# Patient Record
Sex: FEMALE | Race: AMERICAN INDIAN OR ALASKA NATIVE | NOT HISPANIC OR LATINO | Employment: FULL TIME | URBAN - METROPOLITAN AREA
[De-identification: names, ages, dates, MRNs, and addresses within clinical notes are randomized per-mention and may not be internally consistent; named-entity substitution may affect disease eponyms.]

---

## 2017-02-09 ENCOUNTER — ALLIED HEALTH/NURSE VISIT (OUTPATIENT)
Dept: FAMILY MEDICINE | Facility: CLINIC | Age: 32
End: 2017-02-09

## 2017-02-09 VITALS
BODY MASS INDEX: 23.5 KG/M2 | DIASTOLIC BLOOD PRESSURE: 66 MMHG | WEIGHT: 137 LBS | TEMPERATURE: 98 F | SYSTOLIC BLOOD PRESSURE: 100 MMHG

## 2017-02-09 NOTE — NURSING NOTE
I administered the following to Elizabet Stapleton.    MEDICATION: Depo Provera 150mg  ROUTE: IM  SITE: RUQ - Gluteus  DOSE: 150 mg  LOT #: [id number]  :  Q1 Labs   EXPIRATION DATE:  05/2021  NDC#: [address]-0     Was entire vial of medication used? Yes    Name of provider who requested the injection: Dr. Castellon  Name of provider on site at the time of performing the injection: Dr. Cande Sage, MA  [facility]

## 2017-03-17 ENCOUNTER — OFFICE VISIT (OUTPATIENT)
Dept: FAMILY MEDICINE | Facility: CLINIC | Age: 32
End: 2017-03-17

## 2017-03-17 VITALS
HEIGHT: 65 IN | TEMPERATURE: 98.1 F | OXYGEN SATURATION: 98 % | HEART RATE: 72 BPM | WEIGHT: 135 LBS | SYSTOLIC BLOOD PRESSURE: 105 MMHG | BODY MASS INDEX: 22.49 KG/M2 | DIASTOLIC BLOOD PRESSURE: 66 MMHG

## 2017-03-17 DIAGNOSIS — F33.2 SEVERE EPISODE OF RECURRENT MAJOR DEPRESSIVE DISORDER, WITHOUT PSYCHOTIC FEATURES (H): Primary | ICD-10-CM

## 2017-03-17 DIAGNOSIS — N89.8 VAGINAL DISCHARGE: ICD-10-CM

## 2017-03-17 RX ORDER — CLINDAMYCIN PHOSPHATE 20 MG/G
1 CREAM VAGINAL AT BEDTIME
Qty: 40 G | Refills: 0 | Status: SHIPPED | OUTPATIENT
Start: 2017-03-17 | End: 2017-03-24

## 2017-03-17 NOTE — PATIENT INSTRUCTIONS
Amitryptiline - helps with depression and headaches.  It makes most people sleepy, so take it at bedtime.  Starting at low dose (25 mg) - expect to see 25-50% improvement in depression.    Clindamycin - discharge.    Follow-up in April when you're due for next depo.  We'll cover hemorrhoids then too.

## 2017-03-17 NOTE — MR AVS SNAPSHOT
After Visit Summary   3/17/2017    Elizabet Stapleton    MRN: [id number]           Patient Information     Date Of Birth          1985        Visit Information        Provider Department      3/17/2017 9:00 AM Breana Castellon MD [facility]        Today's Diagnoses     Severe episode of recurrent major depressive disorder, without psychotic features (H)    -  1      Care Instructions    Amitryptiline - helps with depression and headaches.  It makes most people sleepy, so take it at bedtime.  Starting at low dose (25 mg) - expect to see 25-50% improvement in depression.    Clindamycin - discharge.    Follow-up in April when you're due for next depo.  We'll cover hemorrhoids then too.        Follow-ups after your visit        Who to contact     Please call your clinic at [phone] to:    Ask questions about your health    Make or cancel appointments    Discuss your medicines    Learn about your test results    Speak to your doctor   If you have compliments or concerns about an experience at your clinic, or if you wish to file a complaint, please contact [facility] Patient Relations at [phone] or email us at [email or web address]         Additional Information About Your Visit        MyChart Information     LiveLoopt gives you secure access to your electronic health record. If you see a primary care provider, you can also send messages to your care team and make appointments. If you have questions, please call your primary care clinic.  If you do not have a primary care provider, please call [phone] and they will assist you.      MyDoc is an electronic gateway that provides easy, online access to your medical records. With MyDoc, you can request a clinic appointment, read your test results, renew a prescription or communicate with your care team.     To access your existing account, please contact your [facility] or call  "[phone] for assistance.        Care EveryWhere ID     This is your Care EveryWhere ID. This could be used by other organizations to access your [facility] medical records  [id number]        Your Vitals Were     Pulse Temperature Height Pulse Oximetry BMI (Body Mass Index)       72 98.1  F (36.7  C) (Oral) 5' 4.5\" (163.8 cm) 98% 22.81 kg/m2        Blood Pressure from Last 3 Encounters:   03/17/17 105/66   02/09/17 100/66   11/10/16 104/64    Weight from Last 3 Encounters:   03/17/17 135 lb (61.2 kg)   02/09/17 137 lb (62.1 kg)   11/10/16 132 lb 6.4 oz (60.1 kg)              Today, you had the following     No orders found for display         Today's Medication Changes          These changes are accurate as of: 3/17/17  9:48 AM.  If you have any questions, ask your nurse or doctor.               Start taking these medicines.        Dose/Directions    amitriptyline 25 MG tablet   Commonly known as:  ELAVIL   Used for:  Severe episode of recurrent major depressive disorder, without psychotic features (H)   Started by:  Breana Castellon MD        Dose:  25 mg   Take 1 tablet (25 mg) by mouth At Bedtime   Quantity:  30 tablet   Refills:  1            Where to get your medicines      These medications were sent to [facility] - [place], [place] - [address], [place] [address]     Phone:  [phone]     amitriptyline 25 MG tablet                Primary Care Provider Office Phone # Fax #    Juanita Casanova [phone]       [address] [address]        Thank you!     Thank you for choosing [facility]  for your care. Our goal is always to provide you with excellent care. Hearing back from our patients is one way we can continue to improve our services. Please take a few minutes to complete the written survey that you may receive in the mail after your visit with us. Thank you!             Your Updated Medication List " - Protect others around you: Learn how to safely use, store and throw away your medicines at www.disposemymeds.org.          This list is accurate as of: 3/17/17  9:48 AM.  Always use your most recent med list.                   Brand Name Dispense Instructions for use    amitriptyline 25 MG tablet    ELAVIL    30 tablet    Take 1 tablet (25 mg) by mouth At Bedtime       hydrOXYzine 25 MG capsule    VISTARIL    120 capsule    Take 1 capsule (25 mg) by mouth 3 times daily as needed for itching       medroxyPROGESTERone 150 MG/ML injection    DEPO-PROVERA    0.9 mL    Inject 1 mL (150 mg) into the muscle every 3 months       order for DME     1 Units    Equipment being ordered: belly band       Prenatal Vitamin 27-0.8 MG Tabs     30 tablet    Take 1 tablet by mouth daily       SUMAtriptan 25 MG tablet    IMITREX    18 tablet    Take 1-2 tablets (25-50 mg) by mouth at onset of headache for migraine May repeat in 2 hours. Max 8 tablets/24 hours.

## 2017-03-24 ENCOUNTER — TELEPHONE (OUTPATIENT)
Dept: FAMILY MEDICINE | Facility: CLINIC | Age: 32
End: 2017-03-24

## 2017-03-24 NOTE — TELEPHONE ENCOUNTER
[facility] phone call message- general phone call:    Reason for call: Called and left a message for pt to schedule a f/u appt on depression. Pt called back and is scheduled for 03/27 at 2:10 with Dr Casanova. Pt no showed for her appt on 03/27.  I called and rescheduled her for 04/05 at 1:50 with Cande.    Return call needed: No    OK to leave a message on voice mail? No    Primary language: English      needed? No    Call taken on March 24, 2017 at 12:48 PM by Kan Culp

## 2017-03-24 NOTE — PROGRESS NOTES
"Subjective   Elizabet Stapleton is a 31 year old female with a history including anxiety, depression, and abnormal Pap smear who presents because her psychologist wanted her to consider starting a depression medication.    She attends weekly therapy at [facility] with Dr. Quezada.  She says that she's tried medications in the past (she doesn t remember the names), but she does not recall them being effective. Review of her chart shows that she was prescribed sertraline 50 mg daily from April 2014 to November 2015.  Progress notes from that time report that she did not notice a benefit but other people in her life, such as her fiance, thought she had improved mood.  She did not have any side effects.  She was reluctant to continue the medication at that time and wanted to pursue therapy instead.    Today, we discussed several classes of medications including SSRIs, SNRIs, TCAs, etc.  The side effects most important to her are: she wants to avoid headaches; prefers a medication with drowsiness side effects for bedtime; and wants one that does not impair libido or cause constipation.  She is not concerned about weight gain.    Today, her PHQ-9 is 27/27.  She endorses passive thoughts that she would be better off dead but denies suicidal ideation, homicidal ideation, or visual or auditory hallucinations.  She does not have a plan to harm herself or others.  She says that if she were to feel that way, she can access her therapy team 24/7 for help.    Social: Non-smoker.    Objective   Vitals: /66  Pulse 72  Temp 98.1  F (36.7  C) (Oral)  Ht 5' 4.5\" (163.8 cm)  Wt 135 lb (61.2 kg)  SpO2 98%  BMI 22.81 kg/m2  General: Pleasant. Young woman. No distress.  Psych: Appropriate grooming and hygiene. Speech normal rate. Thoughts are logical and well-organized. Good insight. Appropriate mood and affect. No evidence of internal stimuli.    Assessment & Plan   Major depressive disorder, currently managed with weekly " therapy but PHQ-9 still 27/27.  -- Discussed various medications.  She's had a trial of sertraline at submaximal dosing (50 mg) in the past with uncertain effect.  Based on side effects she prioritizes, she has decided on amitriptyline to possibly help with headaches and insomnia as well.  Started amitriptyline 25 mg.  -- No SI/HI.    Return to clinic next month to consider dose increase.  Due for Depo next month too.    ------------  A total of 25 minutes were spent face-to-face with the patient during this visit, with 20 minutes of that time spent on shared decision making regarding antidepressant medication options and side effect profiles.

## 2017-04-05 ENCOUNTER — OFFICE VISIT (OUTPATIENT)
Dept: FAMILY MEDICINE | Facility: CLINIC | Age: 32
End: 2017-04-05

## 2017-04-05 VITALS
TEMPERATURE: 98 F | WEIGHT: 136 LBS | SYSTOLIC BLOOD PRESSURE: 108 MMHG | HEART RATE: 72 BPM | BODY MASS INDEX: 22.98 KG/M2 | DIASTOLIC BLOOD PRESSURE: 68 MMHG | OXYGEN SATURATION: 100 %

## 2017-04-05 DIAGNOSIS — F33.2 SEVERE EPISODE OF RECURRENT MAJOR DEPRESSIVE DISORDER, WITHOUT PSYCHOTIC FEATURES (H): Primary | ICD-10-CM

## 2017-04-05 ASSESSMENT — ANXIETY QUESTIONNAIRES
GAD7 TOTAL SCORE: 21
1. FEELING NERVOUS, ANXIOUS, OR ON EDGE: NEARLY EVERY DAY
6. BECOMING EASILY ANNOYED OR IRRITABLE: NEARLY EVERY DAY
7. FEELING AFRAID AS IF SOMETHING AWFUL MIGHT HAPPEN: NEARLY EVERY DAY
2. NOT BEING ABLE TO STOP OR CONTROL WORRYING: NEARLY EVERY DAY
5. BEING SO RESTLESS THAT IT IS HARD TO SIT STILL: NEARLY EVERY DAY
3. WORRYING TOO MUCH ABOUT DIFFERENT THINGS: NEARLY EVERY DAY
IF YOU CHECKED OFF ANY PROBLEMS ON THIS QUESTIONNAIRE, HOW DIFFICULT HAVE THESE PROBLEMS MADE IT FOR YOU TO DO YOUR WORK, TAKE CARE OF THINGS AT HOME, OR GET ALONG WITH OTHER PEOPLE: EXTREMELY DIFFICULT

## 2017-04-05 ASSESSMENT — PATIENT HEALTH QUESTIONNAIRE - PHQ9: 5. POOR APPETITE OR OVEREATING: NEARLY EVERY DAY

## 2017-04-05 NOTE — MR AVS SNAPSHOT
After Visit Summary   4/5/2017    Elizabet Stapleton    MRN: [id number]           Patient Information     Date Of Birth          1985        Visit Information        Provider Department      4/5/2017 1:50 PM Breana Castellon MD [facility]        Today's Diagnoses     Severe episode of recurrent major depressive disorder, without psychotic features (H)    -  1       Follow-ups after your visit        Who to contact     Please call your clinic at [phone] to:    Ask questions about your health    Make or cancel appointments    Discuss your medicines    Learn about your test results    Speak to your doctor   If you have compliments or concerns about an experience at your clinic, or if you wish to file a complaint, please contact [facility] Patient Relations at [phone] or email us at [email or web address]         Additional Information About Your Visit        MyChart Information     Cardpoolt gives you secure access to your electronic health record. If you see a primary care provider, you can also send messages to your care team and make appointments. If you have questions, please call your primary care clinic.  If you do not have a primary care provider, please call [phone] and they will assist you.      [employer] is an electronic gateway that provides easy, online access to your medical records. With [employer], you can request a clinic appointment, read your test results, renew a prescription or communicate with your care team.     To access your existing account, please contact your [facility] or call [phone] for assistance.        Care EveryWhere ID     This is your Care EveryWhere ID. This could be used by other organizations to access your [facility] medical records  [id number]        Your Vitals Were     Pulse Temperature Pulse Oximetry BMI (Body Mass Index)          72 98  F (36.7  C) 100% 22.98 kg/m2         Blood  Pressure from Last 3 Encounters:   04/05/17 108/68   03/17/17 105/66   02/09/17 100/66    Weight from Last 3 Encounters:   04/05/17 136 lb (61.7 kg)   03/17/17 135 lb (61.2 kg)   02/09/17 137 lb (62.1 kg)              Today, you had the following     No orders found for display         Today's Medication Changes          These changes are accurate as of: 4/5/17 11:59 PM.  If you have any questions, ask your nurse or doctor.               Start taking these medicines.        Dose/Directions    sertraline 50 MG tablet   Commonly known as:  ZOLOFT   Used for:  Severe episode of recurrent major depressive disorder, without psychotic features (H)   Started by:  Breana Castellon MD        Take 1/2 tablet (25 mg) for 1-2 weeks, then increase to 1 tablet orally daily   Quantity:  30 tablet   Refills:  0         Stop taking these medicines if you haven't already. Please contact your care team if you have questions.     amitriptyline 25 MG tablet   Commonly known as:  ELAVIL   Stopped by:  Breana Castellon MD                Where to get your medicines      These medications were sent to [facility] - [place], [place] - [address], [place] [address]     Phone:  [phone]     sertraline 50 MG tablet                Primary Care Provider Office Phone # Fax #    Juanita CasanovaDO [phone]       [address] [address]        Thank you!     Thank you for choosing [facility]  for your care. Our goal is always to provide you with excellent care. Hearing back from our patients is one way we can continue to improve our services. Please take a few minutes to complete the written survey that you may receive in the mail after your visit with us. Thank you!             Your Updated Medication List - Protect others around you: Learn how to safely use, store and throw away your medicines at www.disposemymeds.org.          This list is  accurate as of: 4/5/17 11:59 PM.  Always use your most recent med list.                   Brand Name Dispense Instructions for use    hydrOXYzine 25 MG capsule    VISTARIL    120 capsule    Take 1 capsule (25 mg) by mouth 3 times daily as needed for itching       medroxyPROGESTERone 150 MG/ML injection    DEPO-PROVERA    0.9 mL    Inject 1 mL (150 mg) into the muscle every 3 months       order for DME     1 Units    Equipment being ordered: belly band       Prenatal Vitamin 27-0.8 MG Tabs     30 tablet    Take 1 tablet by mouth daily       sertraline 50 MG tablet    ZOLOFT    30 tablet    Take 1/2 tablet (25 mg) for 1-2 weeks, then increase to 1 tablet orally daily       SUMAtriptan 25 MG tablet    IMITREX    18 tablet    Take 1-2 tablets (25-50 mg) by mouth at onset of headache for migraine May repeat in 2 hours. Max 8 tablets/24 hours.

## 2017-04-06 ASSESSMENT — PATIENT HEALTH QUESTIONNAIRE - PHQ9: SUM OF ALL RESPONSES TO PHQ QUESTIONS 1-9: 27

## 2017-04-06 ASSESSMENT — ANXIETY QUESTIONNAIRES: GAD7 TOTAL SCORE: 21

## 2017-04-14 NOTE — PROGRESS NOTES
Subjective   Elizabet Stapleton is a 31-year-old female with history including depression and abnormal Pap smear who presents for depression follow-up since starting a medication at the last visit 3 weeks ago.    At that visit, she was started on amitriptyline, which she chose after some shared decision making.  She chose it because she was interested in the potential benefits of TCAs for her insomnia and headaches as well.  She reports that since starting this, her depressive symptoms have been worse with specifically increased mood swings and detachment from people.  She has not noticed the beneficial side effects of improvement in headaches or sleep, therefore she would like to change to a different medication.  Her PHQ-9 score remains 27/27 today with passive suicidal ideation but no active plans or intent.  She otherwise has fatigue, apathy, insomnia, and decreased concentration.  She is interested in an SSRI today, which was her second choice upon her shared decision making at the last visit.  She is concerned about possible effects on weight and libido.  She continues to see her psychologist weekly, which is her plan of safety contact should she have any active suicidal ideation.    PHQ-9 ([employer]) 4/5/2017   1.  Little interest or pleasure in doing things 3   2.  Feeling down, depressed, or hopeless 3   3.  Trouble falling or staying asleep, or sleeping too much 3   4.  Feeling tired or having little energy 3   5.  Poor appetite or overeating 3   6.  Feeling bad about yourself 3   7.  Trouble concentrating 3   8.  Moving slowly or restless 3   9.  Suicidal or self-harm thoughts 3   PHQ-9 Total Score 27   Difficulty at work, home, or with people Extremely dIfficult     Social: Non-smoker.    Objective   Vitals: /68  Pulse 72  Temp 98  F (36.7  C)  Wt 136 lb (61.7 kg)  SpO2 100%  BMI 22.98 kg/m2  General: Pleasant. Young woman. No distress.  Psych: Appropriate grooming and hygiene. Speech normal rate.  Thoughts are logical and well-organized. Good insight. Appropriate mood and affect. No evidence of internal stimuli.      Assessment & Plan   Major depressive disorder, currently active and severe with PHQ-9 score of 27/27.  She endorses passive suicidal ideation but without intent or active plan.  She contracts for safety.  -- Continue weekly visits with psychotherapist.  -- Recently started on med - amitriptyline - at last visit, but she feels this presented more side effects than benefit and she would like to switch medications today.  Start an SSRI: Ordered sertraline titrating from 25 to 50 mg daily, with plans to increase to 100 at the next visit if well-tolerated.    Return to clinic in 1 month.

## 2017-04-24 ENCOUNTER — TRANSFERRED RECORDS (OUTPATIENT)
Dept: HEALTH INFORMATION MANAGEMENT | Facility: CLINIC | Age: 32
End: 2017-04-24

## 2017-05-30 ENCOUNTER — OFFICE VISIT (OUTPATIENT)
Dept: FAMILY MEDICINE | Facility: CLINIC | Age: 32
End: 2017-05-30

## 2017-05-30 VITALS
WEIGHT: 140.6 LBS | DIASTOLIC BLOOD PRESSURE: 57 MMHG | SYSTOLIC BLOOD PRESSURE: 91 MMHG | TEMPERATURE: 98.2 F | HEART RATE: 66 BPM | BODY MASS INDEX: 23.76 KG/M2

## 2017-05-30 DIAGNOSIS — F33.2 SEVERE EPISODE OF RECURRENT MAJOR DEPRESSIVE DISORDER, WITHOUT PSYCHOTIC FEATURES (H): ICD-10-CM

## 2017-05-30 DIAGNOSIS — Z30.42 ENCOUNTER FOR SURVEILLANCE OF INJECTABLE CONTRACEPTIVE: Primary | ICD-10-CM

## 2017-05-30 LAB — HCG UR QL: NEGATIVE

## 2017-05-30 ASSESSMENT — ANXIETY QUESTIONNAIRES
5. BEING SO RESTLESS THAT IT IS HARD TO SIT STILL: SEVERAL DAYS
2. NOT BEING ABLE TO STOP OR CONTROL WORRYING: SEVERAL DAYS
7. FEELING AFRAID AS IF SOMETHING AWFUL MIGHT HAPPEN: SEVERAL DAYS
1. FEELING NERVOUS, ANXIOUS, OR ON EDGE: SEVERAL DAYS
3. WORRYING TOO MUCH ABOUT DIFFERENT THINGS: SEVERAL DAYS
GAD7 TOTAL SCORE: 8
6. BECOMING EASILY ANNOYED OR IRRITABLE: MORE THAN HALF THE DAYS
IF YOU CHECKED OFF ANY PROBLEMS ON THIS QUESTIONNAIRE, HOW DIFFICULT HAVE THESE PROBLEMS MADE IT FOR YOU TO DO YOUR WORK, TAKE CARE OF THINGS AT HOME, OR GET ALONG WITH OTHER PEOPLE: VERY DIFFICULT

## 2017-05-30 ASSESSMENT — PATIENT HEALTH QUESTIONNAIRE - PHQ9: 5. POOR APPETITE OR OVEREATING: SEVERAL DAYS

## 2017-05-30 NOTE — PATIENT INSTRUCTIONS
Increase zoloft 100mg daily  Follow up in 1-3 weeks    If you have thoughts about self harm or if you just need additional support and care, here are some resources for you:    Crisis Lines:    [facility]:  [phone]  Crisis Connection:  [phone]  [facility] [[email or web address]] Multilingual Crisis Line:  [phone]    You can also consider going to the [facility] at the following address.  Walk ins are welcome:    [address] [place] [address]   [phone] (for 24 hour crisis consultation)    Monday - Friday 8:00am - 7:00pm  Saturday:  11:00am - 3:00pm  Sunday and Holidays Closed    If you feel at risk of immediate harm, go directly to the Emergency Department.

## 2017-05-30 NOTE — Clinical Note
Merrill Iyer- Could you please complete the unfinished variables in your note and route to Dr. Hardin for review and closure? Thank you- Katie

## 2017-05-30 NOTE — PROGRESS NOTES
SUBJECTIVE       Elizabet Stapleton is a 31 year old  female with a PMHx significant for:     Patient Active Problem List   Diagnosis     Abnormal Pap smear of cervix     Generalized anxiety disorder     Major depressive disorder, recurrent episode, moderate (H)     Obsessive-compulsive disorder     Human papillomavirus in conditions classified elsewhere and of unspecified site     Health Care Home     Family history of cystic fibrosis     Contraception     Here today for depression and over due depo    #Depo  -overdue, about a month  -last intercourse was 3 weeks ago, maybe more   -does not want to explore different options for birth control    #Depression/Anxiety  -symptoms for a while but recently started getting help for them   -started on sertaline last visit, 4/2017. Currently at 50mg dose, taking it at night   -reports she was feeling scared at night, so switched to days and was a little better but still feeling paranoia, that someone is watching her. Previously she was having paranoia regarding her children's safety but this is better  -Currently seeing a psychologist, every week   -her PHQ9 is 8/21 today  -Felt that the medication was helping some things  -endorses passive SI but denies specific plan, would contact family or therapist if feelings became worse    Patient speaks English and so an  was not used.    PMH, Medications and Allergies were reviewed and updated as needed.          OBJECTIVE     Vitals:    05/30/17 1508   BP: 91/57   BP Location: Left arm   Patient Position: Chair   Cuff Size: Adult Regular   Pulse: 66   Temp: 98.2  F (36.8  C)   TempSrc: Oral   Weight: 140 lb 9.6 oz (63.8 kg)     Body mass index is 23.76 kg/(m^2).    Gen:  NAD  HEENT: mucous membranes moist  CV:  RRR  - no murmurs, rubs, or gallups  Pulm:  CTAB, no wheezes/rales/rhonchi  Psych: anxious and depressed affect      No results found for this or any previous visit (from the past 24 hour(s)).      ASSESSMENT AND PLAN      Elizabet was seen today for contraception and recheck medication.    Diagnoses and all orders for this visit:    Encounter for surveillance of injectable contraceptive: depo overdue. UPT negative and last intercourse 3 weeks ago. Given depo.   -     HCG Qualitative Urine (UPT)  ([facility])  -     medroxyPROGESTERone (DEPO-PROVERA) injection 150 mg ([facility])    Severe episode of recurrent major depressive disorder, without psychotic features (H): feels better on medication, will increase dose to 100mg. Passive SI but able to contract for safety. Given crisis line numbers. Encouraged her to continue with therapy. No signs of bipolar but does report some feelings of anxiety/ paranoia.   -     sertraline (ZOLOFT) 50 MG tablet; Take 100mg daily.      Patient Instructions   Increase zoloft 100mg daily  Follow up in 1-3 weeks    If you have thoughts about self harm or if you just need additional support and care, here are some resources for you:    Crisis Lines:    [facility] Adult Crisis:  [phone]  Crisis Connection:  [phone]  [facility] Multilingual Crisis Line:  [phone]    You can also consider going to the [facility] at the following address.  Walk ins are welcome:    [address] [place] [address]   [phone] (for 24 hour crisis consultation)    Monday - Friday 8:00am - 7:00pm  Saturday:  11:00am - 3:00pm  Sunday and Holidays Closed    If you feel at risk of immediate harm, go directly to the Emergency Department.      RTC in 2 weeks for follow up or sooner if develops new or worsening symptoms.    Discussed with MD Sabra Felix, PGY- 3

## 2017-05-30 NOTE — NURSING NOTE
I administered the following to Elizabet Pieter.    MEDICATION: Depo Provera 150mg  ROUTE: IM  SITE: LUQ - Gluteus  DOSE: 1 mL  LOT #: [id number]  :  Seratis   EXPIRATION DATE:  10/2021  NDC#: [address]-8     Next Depo due between 8/15/2017 - 8/29/2017    Was entire vial of medication used? Yes    Name of provider who requested the injection: Sabra Iyer  Name of provider on site (faculty or community preceptor) at the time of performing the injection: Avery Hardin, [facility]

## 2017-05-30 NOTE — PROGRESS NOTES
Preceptor attestation:  Patient seen and discussed with the resident. Assessment and plan reviewed with resident and agreed upon.  Supervising physician: Avery Gee  [facility]

## 2017-05-31 ASSESSMENT — PATIENT HEALTH QUESTIONNAIRE - PHQ9: SUM OF ALL RESPONSES TO PHQ QUESTIONS 1-9: 8

## 2017-05-31 ASSESSMENT — ANXIETY QUESTIONNAIRES: GAD7 TOTAL SCORE: 8

## 2017-07-19 ENCOUNTER — OFFICE VISIT (OUTPATIENT)
Dept: FAMILY MEDICINE | Facility: CLINIC | Age: 32
End: 2017-07-19

## 2017-07-19 VITALS
DIASTOLIC BLOOD PRESSURE: 70 MMHG | HEART RATE: 65 BPM | OXYGEN SATURATION: 99 % | HEIGHT: 64 IN | WEIGHT: 134.2 LBS | TEMPERATURE: 97.7 F | BODY MASS INDEX: 22.91 KG/M2 | SYSTOLIC BLOOD PRESSURE: 107 MMHG

## 2017-07-19 DIAGNOSIS — N93.9 VAGINAL BLEEDING: Primary | ICD-10-CM

## 2017-07-19 LAB — B-HCG SERPL-ACNC: <2 MLU/ML (ref 0–4)

## 2017-07-19 ASSESSMENT — ANXIETY QUESTIONNAIRES
IF YOU CHECKED OFF ANY PROBLEMS ON THIS QUESTIONNAIRE, HOW DIFFICULT HAVE THESE PROBLEMS MADE IT FOR YOU TO DO YOUR WORK, TAKE CARE OF THINGS AT HOME, OR GET ALONG WITH OTHER PEOPLE: EXTREMELY DIFFICULT
3. WORRYING TOO MUCH ABOUT DIFFERENT THINGS: NEARLY EVERY DAY
6. BECOMING EASILY ANNOYED OR IRRITABLE: NEARLY EVERY DAY
7. FEELING AFRAID AS IF SOMETHING AWFUL MIGHT HAPPEN: NEARLY EVERY DAY
5. BEING SO RESTLESS THAT IT IS HARD TO SIT STILL: SEVERAL DAYS
1. FEELING NERVOUS, ANXIOUS, OR ON EDGE: NEARLY EVERY DAY
GAD7 TOTAL SCORE: 19
2. NOT BEING ABLE TO STOP OR CONTROL WORRYING: NEARLY EVERY DAY

## 2017-07-19 ASSESSMENT — PATIENT HEALTH QUESTIONNAIRE - PHQ9: 5. POOR APPETITE OR OVEREATING: NEARLY EVERY DAY

## 2017-07-19 NOTE — PATIENT INSTRUCTIONS
[facility]  [address] [place] [address]  [phone]  *Orders have been faxed, they will contact patient to schedule.  Chloé  07/19/17

## 2017-07-19 NOTE — PROGRESS NOTES
"Subjective   Elizabet Stapleton is a 32 year old female with a history including depression and abnormal Pap smear who presents with complaints of persistent vaginal bleeding.    She was seen in 2016 for initiation of Depo for contraception, and since that time she says that she has had daily vaginal bleeding.  It initially started with increased vaginal discharge, but that has since resolved. The bleeding changes in color from pink to brown, sometimes appearing like old blood versus fresh blood. It also changes from moderate to thick in consistency.  She has been on Depo in the past, and she did not have symptoms like this. More recently in the past 2 weeks she developed pain in her back and pelvis, to the point that she couldn't even walk at one point. And over the past few days, she has been having passage of tissue that looks like chunks of meat. She does not think that she could be pregnant because she has not been very sexually active with this bleeding.  She had negative pregnancy tests on 11/10/16 and 17. She is a . No fevers.  She otherwise denies any GI symptoms including nausea, vomiting, or diarrhea, though she does endorse some rectal bleeding from known hemorrhoids.  She denies weakness or lightheadedness.    Past Pap smear results:  7/27/15 ASCUS Pap with neg HPV -- repeat co-testing in 3 years  12 nl Pap but no ECC (pt pregnant), pt needs repeat Pap in 3yr  3/9/11 nl Pap  09 ASCUS Pap with negative HPV  08 COLP APPT: cervical bx and ECC negative for dysplasia  08 ASCUS Pap with + high risk HPV 35,6  3/30/06 nl Pap    Social: Non-smoker.    Objective   Vitals: /70  Pulse 65  Temp 97.7  F (36.5  C) (Oral)  Ht 5' 4.25\" (163.2 cm)  Wt 134 lb 3.2 oz (60.9 kg)  SpO2 99%  BMI 22.86 kg/m2  General: Pleasant. Young woman. No distress.  Heart: Regular rate and rhythm. No murmurs, rubs, or gallops.  Lungs: Clear to auscultation bilaterally. No wheezes or crackles. Good " air movement.  GI: Abdomen normal to inspection. No ridigidity, distension, or guarding. Normoactive bowel sounds. Soft and non-tender to palpation throughout abdomen.  : External genitalia normal to inspection. No vaginal discharge. Blood and blood clots in vagina, appears to be coming from endocervix.  No other sites of bleeding identified.    Labs reviewed:  Component Chlamydia trac,Amplified Prb N gonorrhoeae,Amplified Prb   Latest Ref Rng & Units Negative Negative   3/9/2011 Negative Negative   8/22/2012 Negative Negative   7/27/2015 Negative Negative   12/28/2015 Negative Negative       Assessment & Plan   32-year-old female presenting with persistent vaginal bleeding since initiation of double in November 2016, with associated pelvic pain and passage of clots and tissue-like material.    Initially it was thought that this bleeding was related to the recent initiation of Depo, but given that she has had daily bleeding for 8 months now, this is unexpected.  Differential infection, malignancy, miscarriage on unrecognized pregnancy, fibroids, etc.  -- B-HCG quant.  -- Check chlamydia and gonorrhea.  No history of this.  -- Pap smear collected.  History of abnromal Pap smears with ASCUS on most recent in 2015.  -- Pelvic ultrasound ordered.    Follow-up next week after all these results come back.

## 2017-07-19 NOTE — MR AVS SNAPSHOT
After Visit Summary   7/19/2017    Elizabet Stapleton    MRN: [id number]           Patient Information     Date Of Birth          1985        Visit Information        Provider Department      7/19/2017 11:00 AM Breana Castellon MD [facility]        Today's Diagnoses     Vaginal bleeding    -  1      Care Instructions    [facility]  [address] [place] [address]  [phone]  *Orders have been faxed, they will contact patient to schedule.  Chloé  07/19/17            Follow-ups after your visit        Future tests that were ordered for you today     Open Future Orders        Priority Expected Expires Ordered    US PELVIS COMPLETE Routine  7/19/2018 7/19/2017            Who to contact     Please call your clinic at [phone] to:    Ask questions about your health    Make or cancel appointments    Discuss your medicines    Learn about your test results    Speak to your doctor   If you have compliments or concerns about an experience at your clinic, or if you wish to file a complaint, please contact [facility] Patient Relations at [phone] or email us at [email or web address]         Additional Information About Your Visit        MyChart Information     SMRxTt gives you secure access to your electronic health record. If you see a primary care provider, you can also send messages to your care team and make appointments. If you have questions, please call your primary care clinic.  If you do not have a primary care provider, please call [phone] and they will assist you.      Maintenance Assistant is an electronic gateway that provides easy, online access to your medical records. With Maintenance Assistant, you can request a clinic appointment, read your test results, renew a prescription or communicate with your care team.     To access your existing account, please contact your [facility] or call [phone] for assistance.        Care  "EveryWhere ID     This is your Care EveryWhere ID. This could be used by other organizations to access your [facility] medical records  [id number]        Your Vitals Were     Pulse Temperature Height Pulse Oximetry BMI (Body Mass Index)       65 97.7  F (36.5  C) (Oral) 5' 4.25\" (163.2 cm) 99% 22.86 kg/m2        Blood Pressure from Last 3 Encounters:   07/19/17 107/70   05/30/17 91/57   04/05/17 108/68    Weight from Last 3 Encounters:   07/19/17 134 lb 3.2 oz (60.9 kg)   05/30/17 140 lb 9.6 oz (63.8 kg)   04/05/17 136 lb (61.7 kg)              We Performed the Following     [facility]CG [facility]     Chlamydia/Gono Amplified ([facility])     GYN Cytology ([facility])        Primary Care Provider Office Phone # Fax #    Juanita Amisha Casanova [phone]       [place] [address]        Equal Access to Services     GERMAIN JONES : Hadii aad ku hadasho Soomaali, waaxda luqadaha, qaybta kaalmada adeegyada, waxterese wren . So [facility] [phone].    ATENCIÓN: Si habla español, tiene a benites disposición servicios gratuitos de asistencia lingüística. Llame al [phone].    We comply with applicable federal civil rights laws and [place] laws. We do not discriminate on the basis of race, color, national origin, age, disability sex, sexual orientation or gender identity.            Thank you!     Thank you for choosing [facility]  for your care. Our goal is always to provide you with excellent care. Hearing back from our patients is one way we can continue to improve our services. Please take a few minutes to complete the written survey that you may receive in the mail after your visit with us. Thank you!             Your Updated Medication List - Protect others around you: Learn how to safely use, store and throw away your medicines at www.disposemymeds.org.          This list is accurate as of: 7/19/17 11:59 PM.  Always use " your most recent med list.                   Brand Name Dispense Instructions for use Diagnosis    hydrOXYzine 25 MG capsule    VISTARIL    120 capsule    Take 1 capsule (25 mg) by mouth 3 times daily as needed for itching    Anxiety       medroxyPROGESTERone 150 MG/ML injection    DEPO-PROVERA    0.9 mL    Inject 1 mL (150 mg) into the muscle every 3 months    Encounter for initial prescription of injectable contraceptive       order for DME     1 Units    Equipment being ordered: belly band    Back pain in pregnancy       Prenatal Vitamin 27-0.8 MG Tabs     30 tablet    Take 1 tablet by mouth daily    Pregnancy test positive       sertraline 50 MG tablet    ZOLOFT    120 tablet    Take 100mg daily.    Severe episode of recurrent major depressive disorder, without psychotic features (H)       SUMAtriptan 25 MG tablet    IMITREX    18 tablet    Take 1-2 tablets (25-50 mg) by mouth at onset of headache for migraine May repeat in 2 hours. Max 8 tablets/24 hours.    Migraine without status migrainosus, not intractable, unspecified migraine type

## 2017-07-20 ENCOUNTER — TELEPHONE (OUTPATIENT)
Dept: FAMILY MEDICINE | Facility: CLINIC | Age: 32
End: 2017-07-20

## 2017-07-20 LAB
C TRACH RRNA CVX QL NAA+PROBE: NEGATIVE
N GONORRHOEA RRNA SPEC QL NAA+PROBE: NEGATIVE

## 2017-07-20 ASSESSMENT — PATIENT HEALTH QUESTIONNAIRE - PHQ9: SUM OF ALL RESPONSES TO PHQ QUESTIONS 1-9: 16

## 2017-07-20 ASSESSMENT — ANXIETY QUESTIONNAIRES: GAD7 TOTAL SCORE: 19

## 2017-07-20 NOTE — TELEPHONE ENCOUNTER
[facility] phone call message- patient requesting results:    Test: Lab    Date of test: 07/19/17    Additional Comments: Please call with lab results.    OK to leave a message on voice mail? Yes    Primary language: English      needed? No    Call taken on July 20, 2017 at 9:31 AM by Linda Cooper

## 2017-07-20 NOTE — TELEPHONE ENCOUNTER
Neg preg test given. Gc/chlamydia and pap are not back yet patient wants DR. Castellon to know that she will go and have her US appt tomorrow. /KEY Thomas

## 2017-07-21 DIAGNOSIS — N93.9 VAGINAL BLEEDING: ICD-10-CM

## 2017-07-21 DIAGNOSIS — N84.0 ENDOMETRIAL POLYP: Primary | ICD-10-CM

## 2017-07-24 ENCOUNTER — TELEPHONE (OUTPATIENT)
Dept: FAMILY MEDICINE | Facility: CLINIC | Age: 32
End: 2017-07-24

## 2017-07-24 NOTE — TELEPHONE ENCOUNTER
[facility] phone call message- patient requesting results:    Test: Lab and Ultrasound    Date of test: 7/20/2017    Additional Comments: the pt called to ask for her results and would like a call back     OK to leave a message on voice mail? Yes    Primary language: English      needed? No    Call taken on July 24, 2017 at 9:03 AM by Raymundo Tyler

## 2017-07-24 NOTE — TELEPHONE ENCOUNTER
Results reviewed in clinic by Dr. Pa. Patient notified of normal results and advised to make a follow up appt if symptoms persist. Patient was transferred to appt. /KEY Thomas  Routed to Dr. Castellon

## 2017-07-25 LAB
INTERPRETATION: NORMAL
INTERPRETER: NORMAL

## 2017-07-27 ENCOUNTER — RECORDS - HEALTHEAST (OUTPATIENT)
Dept: ADMINISTRATIVE | Facility: OTHER | Age: 32
End: 2017-07-27

## 2017-07-27 DIAGNOSIS — R45.4 ANGER REACTION: Primary | ICD-10-CM

## 2017-07-27 LAB
CYTOLOGY CVX/VAG DOC THIN PREP: NORMAL
HIGH RISK?: YES
HPV REFLEX?: NORMAL
LAB AP ABNORMAL BLEEDING: YES
LAB AP BIRTH CONTROL/HORMONES: NORMAL
LAB AP CASE REPORT: NORMAL
LAB AP CERVICAL APPEARANCE: NORMAL
LAB AP MALIGNANT?: NORMAL
LAB AP PATIENT STATUS: NORMAL
LAB AP PREVIOUS ABNL: NORMAL
LAB AP PREVIOUS NORMAL: NORMAL
LAST MENS PERIOD: NORMAL
SPECIMEN ADEQUACY:: NORMAL

## 2017-07-27 RX ORDER — TOPIRAMATE 25 MG/1
25 TABLET, FILM COATED ORAL AT BEDTIME
Qty: 30 TABLET | Refills: 0 | Status: SHIPPED | OUTPATIENT
Start: 2017-07-27 | End: 2017-09-11

## 2017-07-27 NOTE — PROGRESS NOTES
At last visit, we discussed that much of her mood symptoms might be stemming from inappropriate/exaggerated feelings of anger, which subsequently makes her depressed when her children are fearful of her reactions.  No abuse of children or others.    We'll plan to start topiramate, initially at 25 mg qhs, and titrating up as needed.    Breana Castellon MD

## 2017-08-01 NOTE — PATIENT INSTRUCTIONS
[facility]  [phone]  Patient given information to call and schedule  Maria Del Rosario Smith 9:13 AM 8/1/2017

## 2017-08-28 ENCOUNTER — TRANSFERRED RECORDS (OUTPATIENT)
Dept: HEALTH INFORMATION MANAGEMENT | Facility: CLINIC | Age: 32
End: 2017-08-28

## 2017-09-11 ENCOUNTER — OFFICE VISIT (OUTPATIENT)
Dept: FAMILY MEDICINE | Facility: CLINIC | Age: 32
End: 2017-09-11

## 2017-09-11 VITALS
HEART RATE: 63 BPM | TEMPERATURE: 98 F | OXYGEN SATURATION: 99 % | DIASTOLIC BLOOD PRESSURE: 65 MMHG | SYSTOLIC BLOOD PRESSURE: 100 MMHG | HEIGHT: 65 IN | WEIGHT: 139.8 LBS | BODY MASS INDEX: 23.29 KG/M2

## 2017-09-11 DIAGNOSIS — F33.2 SEVERE EPISODE OF RECURRENT MAJOR DEPRESSIVE DISORDER, WITHOUT PSYCHOTIC FEATURES (H): ICD-10-CM

## 2017-09-11 DIAGNOSIS — R45.4 ANGER REACTION: ICD-10-CM

## 2017-09-11 DIAGNOSIS — Z30.42 ENCOUNTER FOR SURVEILLANCE OF INJECTABLE CONTRACEPTIVE: Primary | ICD-10-CM

## 2017-09-11 LAB — HCG UR QL: NEGATIVE

## 2017-09-11 RX ORDER — TOPIRAMATE 50 MG/1
50 TABLET, FILM COATED ORAL 2 TIMES DAILY
Qty: 180 TABLET | Refills: 0 | Status: SHIPPED | OUTPATIENT
Start: 2017-09-11 | End: 2023-08-03

## 2017-09-11 RX ORDER — SERTRALINE HYDROCHLORIDE 100 MG/1
100 TABLET, FILM COATED ORAL DAILY
Qty: 90 TABLET | Refills: 3 | Status: SHIPPED | OUTPATIENT
Start: 2017-09-11 | End: 2023-08-03

## 2017-09-11 ASSESSMENT — ANXIETY QUESTIONNAIRES
GAD7 TOTAL SCORE: 13
6. BECOMING EASILY ANNOYED OR IRRITABLE: NOT AT ALL
5. BEING SO RESTLESS THAT IT IS HARD TO SIT STILL: MORE THAN HALF THE DAYS
7. FEELING AFRAID AS IF SOMETHING AWFUL MIGHT HAPPEN: NOT AT ALL
2. NOT BEING ABLE TO STOP OR CONTROL WORRYING: NEARLY EVERY DAY
3. WORRYING TOO MUCH ABOUT DIFFERENT THINGS: NEARLY EVERY DAY
1. FEELING NERVOUS, ANXIOUS, OR ON EDGE: NEARLY EVERY DAY
IF YOU CHECKED OFF ANY PROBLEMS ON THIS QUESTIONNAIRE, HOW DIFFICULT HAVE THESE PROBLEMS MADE IT FOR YOU TO DO YOUR WORK, TAKE CARE OF THINGS AT HOME, OR GET ALONG WITH OTHER PEOPLE: VERY DIFFICULT

## 2017-09-11 ASSESSMENT — PATIENT HEALTH QUESTIONNAIRE - PHQ9
5. POOR APPETITE OR OVEREATING: MORE THAN HALF THE DAYS
SUM OF ALL RESPONSES TO PHQ QUESTIONS 1-9: 10

## 2017-09-11 NOTE — NURSING NOTE
I administered the following to Elizabet Stapleton.    MEDICATION: Medroxyprogesterone 150 mg  ROUTE: IM  SITE: LUQ - Gluteus  DOSE: 150 mg per 1 ml  LOT #: [id number]  :  [employer]   EXPIRATION DATE:  01 / 2022  NDC#: [address]-7   The reminder card was given to patient and the next depo is due between 11/27/2017 - 12/11/2017    Per Dr. Castellon, the depo was given to patient.   Was entire vial of medication used? Yes    Name of provider who requested the injection: Dr. Castellon  Name of provider on site (faculty or community preceptor) at the time of performing the injection: Dr. Castellon    November Paw, [facility]

## 2017-09-11 NOTE — PATIENT INSTRUCTIONS
Take one 50 mg tab in the morning, and half (25 mg) in the evening for about a week    Then step up to 50 mg twice a day    Message has been sent to  team to advise for mental health referral. See documentation encounter for details.   Chloé  09/25/17

## 2017-09-11 NOTE — MR AVS SNAPSHOT
After Visit Summary   9/11/2017    Elizabet Stapleton    MRN: [id number]           Patient Information     Date Of Birth          1985        Visit Information        Provider Department      9/11/2017 8:20 AM Breana Castellon MD [facility]        Today's Diagnoses     Contraception    -  1    Severe episode of recurrent major depressive disorder, without psychotic features (H)        Anger reaction          Care Instructions    Take one 50 mg tab in the morning, and half (25 mg) in the evening for about a week    Then step up to 50 mg twice a day          Follow-ups after your visit        Who to contact     Please call your clinic at [phone] to:    Ask questions about your health    Make or cancel appointments    Discuss your medicines    Learn about your test results    Speak to your doctor   If you have compliments or concerns about an experience at your clinic, or if you wish to file a complaint, please contact [facility] Patient Relations at [phone] or email us at [email or web address]         Additional Information About Your Visit        MyChart Information     9DIAMOND gives you secure access to your electronic health record. If you see a primary care provider, you can also send messages to your care team and make appointments. If you have questions, please call your primary care clinic.  If you do not have a primary care provider, please call [phone] and they will assist you.      9DIAMOND is an electronic gateway that provides easy, online access to your medical records. With 9DIAMOND, you can request a clinic appointment, read your test results, renew a prescription or communicate with your care team.     To access your existing account, please contact your [facility] or call [phone] for assistance.        Care EveryWhere ID     This is your Care EveryWhere ID. This could be used by other organizations  "to access your [place] medical records  [id number]        Your Vitals Were     Pulse Temperature Height Pulse Oximetry BMI (Body Mass Index)       63 98  F (36.7  C) (Oral) 5' 4.5\" (163.8 cm) 99% 23.63 kg/m2        Blood Pressure from Last 3 Encounters:   09/11/17 100/65   07/19/17 107/70   05/30/17 91/57    Weight from Last 3 Encounters:   09/11/17 139 lb 12.8 oz (63.4 kg)   07/19/17 134 lb 3.2 oz (60.9 kg)   05/30/17 140 lb 9.6 oz (63.8 kg)              We Performed the Following     HCG Qualitative Urine (UPT)  ([facility])          Today's Medication Changes          These changes are accurate as of: 9/11/17  9:40 AM.  If you have any questions, ask your nurse or doctor.               These medicines have changed or have updated prescriptions.        Dose/Directions    sertraline 100 MG tablet   Commonly known as:  ZOLOFT   This may have changed:    - medication strength  - how much to take  - how to take this  - when to take this  - additional instructions   Used for:  Severe episode of recurrent major depressive disorder, without psychotic features (H)   Changed by:  Breana Castellon MD        Dose:  100 mg   Take 1 tablet (100 mg) by mouth daily   Quantity:  90 tablet   Refills:  3       topiramate 50 MG tablet   Commonly known as:  TOPAMAX   This may have changed:    - medication strength  - how much to take  - when to take this  - additional instructions   Used for:  Anger reaction   Changed by:  Breana Castellon MD        Dose:  50 mg   Take 1 tablet (50 mg) by mouth 2 times daily   Quantity:  180 tablet   Refills:  0            Where to get your medicines      These medications were sent to [facility] - [place], [place] - [facility] [address], [place] [address]     Phone:  [phone]     sertraline 100 MG tablet    topiramate 50 MG tablet                Primary Care Provider Office Phone # Fax #    Breana Castellon [phone]       [facility] " CLINIC [address] [place] [address]        Equal Access to Services     GERMAIN JONES : Hadii aad ku hadmonroesoni Lai, waamina son, qarain reyna, cedric farley. So [facility] [phone].    ATENCIÓN: Si habla español, tiene a benites disposición servicios gratuitos de asistencia lingüística. Colliname al [phone].    We comply with applicable federal civil rights laws and [place] laws. We do not discriminate on the basis of race, color, national origin, age, disability sex, sexual orientation or gender identity.            Thank you!     Thank you for choosing [facility]  for your care. Our goal is always to provide you with excellent care. Hearing back from our patients is one way we can continue to improve our services. Please take a few minutes to complete the written survey that you may receive in the mail after your visit with us. Thank you!             Your Updated Medication List - Protect others around you: Learn how to safely use, store and throw away your medicines at www.disposemymeds.org.          This list is accurate as of: 9/11/17  9:40 AM.  Always use your most recent med list.                   Brand Name Dispense Instructions for use Diagnosis    hydrOXYzine 25 MG capsule    VISTARIL    120 capsule    Take 1 capsule (25 mg) by mouth 3 times daily as needed for itching    Anxiety       medroxyPROGESTERone 150 MG/ML injection    DEPO-PROVERA    0.9 mL    Inject 1 mL (150 mg) into the muscle every 3 months    Encounter for initial prescription of injectable contraceptive       order for DME     1 Units    Equipment being ordered: belly band    Back pain in pregnancy       Prenatal Vitamin 27-0.8 MG Tabs     30 tablet    Take 1 tablet by mouth daily    Pregnancy test positive       sertraline 100 MG tablet    ZOLOFT    90 tablet    Take 1 tablet (100 mg) by mouth daily    Severe episode of recurrent major depressive disorder, without psychotic features (H)        SUMAtriptan 25 MG tablet    IMITREX    18 tablet    Take 1-2 tablets (25-50 mg) by mouth at onset of headache for migraine May repeat in 2 hours. Max 8 tablets/24 hours.    Migraine without status migrainosus, not intractable, unspecified migraine type       topiramate 50 MG tablet    TOPAMAX    180 tablet    Take 1 tablet (50 mg) by mouth 2 times daily    Anger reaction

## 2017-09-11 NOTE — PROGRESS NOTES
Subjective   Elizabet Stapleton is a 32-year-old female with a history including anxiety, depression, and abnormal Pap smear who presents for follow-up after recently starting topiramate for anger management issues.    In the past, she was being treated for depression, but it seems that a lot of her depressive symptoms were related to anger issues.  For example, she will get angry, and though not abusive towards her children, they would sometimes be cautious around her, and that made her feel guilty or bad about herself.  She was started on topiramate 25 mg nightly.  She found it particularly helpful, so after 1 week she increased the medication herself to 25 mg twice a day.  Her PHQ 9 has improved, specifically regarding symptoms of decreased pleasure and feeling bad about herself.  Today, she also denies suicidal or homicidal ideation.  No visual or auditory hallucinations.    PHQ-9 ([employer]) 11/10/2016   1.  Little interest or pleasure in doing things 3   2.  Feeling down, depressed, or hopeless 3   3.  Trouble falling or staying asleep, or sleeping too much 3   4.  Feeling tired or having little energy 3   5.  Poor appetite or overeating 3   6.  Feeling bad about yourself 3   7.  Trouble concentrating 3   8.  Moving slowly or restless 3   9.  Suicidal or self-harm thoughts 3   PHQ-9 Total Score 27   Difficulty at work, home, or with people Extremely dIfficult     PHQ-9 ([employer]) 4/5/2017   1.  Little interest or pleasure in doing things 3   2.  Feeling down, depressed, or hopeless 3   3.  Trouble falling or staying asleep, or sleeping too much 3   4.  Feeling tired or having little energy 3   5.  Poor appetite or overeating 3   6.  Feeling bad about yourself 3   7.  Trouble concentrating 3   8.  Moving slowly or restless 3   9.  Suicidal or self-harm thoughts 3   PHQ-9 Total Score 27   Difficulty at work, home, or with people Extremely dIfficult     PHQ-9 ([employer]) 5/30/2017   1.  Little interest or pleasure in doing  "things 1   2.  Feeling down, depressed, or hopeless 1   3.  Trouble falling or staying asleep, or sleeping too much 3   4.  Feeling tired or having little energy 0   5.  Poor appetite or overeating 0   6.  Feeling bad about yourself 0   7.  Trouble concentrating 3   8.  Moving slowly or restless 0   9.  Suicidal or self-harm thoughts 0   PHQ-9 Total Score 8   Difficulty at work, home, or with people Very difficult     PHQ-9 ([employer]) 7/19/2017   1.  Little interest or pleasure in doing things 3   2.  Feeling down, depressed, or hopeless 1   3.  Trouble falling or staying asleep, or sleeping too much 3   4.  Feeling tired or having little energy 3   5.  Poor appetite or overeating 3   6.  Feeling bad about yourself 1   7.  Trouble concentrating 1   8.  Moving slowly or restless 1   9.  Suicidal or self-harm thoughts 0   PHQ-9 Total Score 16   Difficulty at work, home, or with people Extremely dIfficult     PHQ-9 ([employer]) 9/11/2017   1.  Little interest or pleasure in doing things 0   2.  Feeling down, depressed, or hopeless 1   3.  Trouble falling or staying asleep, or sleeping too much 3   4.  Feeling tired or having little energy 2   5.  Poor appetite or overeating 2   6.  Feeling bad about yourself 0   7.  Trouble concentrating 2   8.  Moving slowly or restless 0   9.  Suicidal or self-harm thoughts 0   PHQ-9 Total Score 10   Difficulty at work, home, or with people Very difficult     Social: Non-smoker.    Objective   Vitals: /65  Pulse 63  Temp 98  F (36.7  C) (Oral)  Ht 5' 4.5\" (163.8 cm)  Wt 139 lb 12.8 oz (63.4 kg)  SpO2 99%  BMI 23.63 kg/m2  General: Pleasant. Young woman. No distress.  Psych: Appropriate grooming and hygiene. Speech normal rate. Thoughts are logical and well-organized. Appropriate mood and affect. No evidence of internal stimuli.    Labs:  Results for orders placed or performed in visit on 09/11/17   HCG Qualitative Urine (UPT)  ([facility])   Result Value Ref Range    HCG Qual Urine " NEGATIVE Negative       Assessment & Plan   Follow-up of depression with a large component of anger symptoms, improving with medications (sertraline 100 + topiramate 25 bid), but she would like to increase the medications further.  No side effects.  -- Continue sertraline 100 mg daily.  -- Increase topiramate: increase to 50 mg QAM and 25 mg QPM, then to 50 mg BID.  -- Referral to psychiatry consult team: question regarding whether there are other treatment suggestions, since I don't manage anger routinely.    Contraception: Depo shot given today.    Return to clinic for psychiatry consult team visit.

## 2017-09-12 ASSESSMENT — ANXIETY QUESTIONNAIRES: GAD7 TOTAL SCORE: 13

## 2017-09-20 ENCOUNTER — OFFICE VISIT (OUTPATIENT)
Dept: FAMILY MEDICINE | Facility: CLINIC | Age: 32
End: 2017-09-20

## 2017-09-20 VITALS
WEIGHT: 133.8 LBS | DIASTOLIC BLOOD PRESSURE: 64 MMHG | HEIGHT: 64 IN | OXYGEN SATURATION: 99 % | BODY MASS INDEX: 22.84 KG/M2 | TEMPERATURE: 97.9 F | HEART RATE: 67 BPM | SYSTOLIC BLOOD PRESSURE: 104 MMHG

## 2017-09-20 DIAGNOSIS — Z01.818 PREOP GENERAL PHYSICAL EXAM: Primary | ICD-10-CM

## 2017-09-20 LAB — HEMOGLOBIN: 14.6 G/DL (ref 11.7–15.7)

## 2017-09-20 NOTE — MR AVS SNAPSHOT
After Visit Summary   9/20/2017    Elizabet Stapleton    MRN: [id number]           Patient Information     Date Of Birth          1985        Visit Information        Provider Department      9/20/2017 2:30 PM Toy Carolina MD [facility]        Today's Diagnoses     Preop general physical exam    -  1      Care Instructions      Presurgery Checklist  You are scheduled to have surgery. The healthcare staff will try to make your stay comfortable. Use the guidelines below to remind yourself what to do before surgery. Be sure to follow any specific pre-op instructions from your surgeon or nurse.   Preparing for Surgery  Ask your surgeon if you ll need a blood transfusion during surgery and if so, how to prepare for it. In some cases, you can donate blood before surgery. If needed, this blood can be given back (transfused) to you during or after surgery.  If you are having abdominal surgery, ask what you need to do to clear your bowel.  Tell your surgeon if you have allergies to any medications or foods.  Arrange for an adult family member or friend to drive you home after surgery. If possible, have someone ready to help you at home as you recover.  Call the surgeon if you get a cold, fever, sore throat, diarrhea, or other health problem just before surgery. Your surgeon can decide whether or not to postpone the surgery.  Medications  Tell your surgeon about all medications you take, including prescription and over-the-counter products such as herbal remedies and vitamins. Ask if you should continue taking them.  If you take ibuprofen, naproxen, or  blood thinners  such as aspirin, clopidogrel (Plavix), or warfarin (Coumadin), ask your surgeon whether you should stop taking them and how long before surgery you should stop.  You may be told to take antibiotics just before surgery to prevent infection. If so, follow instructions carefully on how to take them.  If you are told to take medications called  anticoagulants to prevent blood clots after surgery, be sure to follow the instructions on how to take them.  Stop Smoking  If you smoke, healing may take longer. So at least 2 week(s) before surgery, stop smoking.  Bathing or Showering Before Surgery  If instructed, wash with antibacterial soap. Afterward, do not use lotions or powders.  If you are having surgery on the head, you may be asked to shampoo with antibacterial soap. Follow instructions for doing so.  Do Not Remove Hair from the Surgery Site  Do not shave hair from the incision site, unless you are given specific instructions to do so. Usually, if hair needs to be removed, it will be done at the hospital right before surgery.  Don t Eat or Drink  Your doctor will tell you when to stop eating and drinking. If you do not follow your doctor's instructions, your procedure may be postponed or rescheduled for another day.  If your surgeon tells you to continue any medications, take them with small sips of water.  You can brush your teeth and rinse your mouth, but don t swallow any water.  Day of Surgery  Do not wear makeup. Do not use perfume, deodorant, or hairspray. Remove nail polish and artificial nails.  Leave jewelry (including rings), watches, and other valuables at home.  Be sure to bring health insurance cards or forms and a photo ID.  Bring a list of your medications (include the name, dose, how often you take them, and the time last dose was taken).  Arrive on time at the hospital or surgery facility.          Follow-ups after your visit        Follow-up notes from your care team     Return in about 2 weeks (around 10/4/2017) for Follow up hysteroscopy/polypectomy .      Who to contact     Please call your clinic at [phone] to:    Ask questions about your health    Make or cancel appointments    Discuss your medicines    Learn about your test results    Speak to your doctor   If you have compliments or concerns about an experience at your  "clinic, or if you wish to file a complaint, please contact [facility] Patient Relations at [phone] or email us at [email or web address]         Additional Information About Your Visit        vogogohart Information     PhoneTell gives you secure access to your electronic health record. If you see a primary care provider, you can also send messages to your care team and make appointments. If you have questions, please call your primary care clinic.  If you do not have a primary care provider, please call [phone] and they will assist you.      PhoneTell is an electronic gateway that provides easy, online access to your medical records. With PhoneTell, you can request a clinic appointment, read your test results, renew a prescription or communicate with your care team.     To access your existing account, please contact your [facility] or call [phone] for assistance.        Care EveryWhere ID     This is your Care EveryWhere ID. This could be used by other organizations to access your [place] medical records  [id number]        Your Vitals Were     Pulse Temperature Height Pulse Oximetry BMI (Body Mass Index)       67 97.9  F (36.6  C) (Oral) 5' 4\" (162.6 cm) 99% 22.97 kg/m2        Blood Pressure from Last 3 Encounters:   09/20/17 104/64   09/11/17 100/65   07/19/17 107/70    Weight from Last 3 Encounters:   09/20/17 133 lb 12.8 oz (60.7 kg)   09/11/17 139 lb 12.8 oz (63.4 kg)   07/19/17 134 lb 3.2 oz (60.9 kg)              We Performed the Following     Hemoglobin (HGB) ([facility])        Primary Care Provider Office Phone # Fax #    Breanakelly Castellon [phone]       [facility] [address] [place] [address]        Equal Access to Services     GERMAIN FARLEY: Gloria Lai, wamickda luqtova, qaybta kaalmada maribell, cedric farley. So [facility] [phone].    ATENCIÓN: " Si habla bonnie, tiene a benites disposición servicios gratuitos de asistencia lingüística. Alberto jonas [phone].    We comply with applicable federal civil rights laws and [place] laws. We do not discriminate on the basis of race, color, national origin, age, disability sex, sexual orientation or gender identity.            Thank you!     Thank you for choosing [facility]  for your care. Our goal is always to provide you with excellent care. Hearing back from our patients is one way we can continue to improve our services. Please take a few minutes to complete the written survey that you may receive in the mail after your visit with us. Thank you!             Your Updated Medication List - Protect others around you: Learn how to safely use, store and throw away your medicines at www.disposemymeds.org.          This list is accurate as of: 9/20/17  4:19 PM.  Always use your most recent med list.                   Brand Name Dispense Instructions for use Diagnosis    medroxyPROGESTERone 150 MG/ML injection    DEPO-PROVERA    0.9 mL    Inject 1 mL (150 mg) into the muscle every 3 months    Encounter for initial prescription of injectable contraceptive       sertraline 100 MG tablet    ZOLOFT    90 tablet    Take 1 tablet (100 mg) by mouth daily    Severe episode of recurrent major depressive disorder, without psychotic features (H)       topiramate 50 MG tablet    TOPAMAX    180 tablet    Take 1 tablet (50 mg) by mouth 2 times daily    Anger reaction

## 2017-09-20 NOTE — PROGRESS NOTES
Preceptor attestation:  Patient seen and discussed with the resident. Assessment and plan reviewed with resident and agreed upon.  Supervising physician: Shaan Miranda  [facility]

## 2017-09-20 NOTE — PROGRESS NOTES
[address] [address]  Phone: [phone]  Fax: [phone]    9/20/2017    Adult PRE-OP Evaluation:    Elizabet Stapleton, 1985, presents for pre-operative evaluation and assessment as requested by Dr. Sheridan Abraham, prior to undergoing surgery/procedure for treatment of abnormal uterine bleeding and suspected endometrial polyp.      Proposed procedure: Hysteroscopy D&C and Polypectomy    Date of Surgery/ Procedure: 9/20/17  Hospital/Surgical Facility: [facility], [facility]     Primary Physician: Breana Castellon  Type of Anesthesia Anticipated: Unknown  History of anesthesia complications: NONE  History of abnormal bleeding: NONE   History of blood transfusions: NO  Patient has a Health Care Directive or Living Will:  NO    Preoperative Questions   1. NO - Do you have a history of heart attack, stroke, stent, bypass or surgery on an artery in the head, neck, heart or legs?  2. NO - Do you ever have any pain or discomfort in your chest?  3. NO - Have you ever had a severe pain across the front of your chest lasting for half an hour or more?  4. NO - Do you have a history of Congestive Heart Failure?  5. NO - Are you troubled by shortness of breath when: walking on the level/ up a slight hill/ at night?  6. NO - Does your chest ever sound wheezy or whistling?  7. NO - Do you currently have a cold, bronchitis or other respiratory infection?  8. NO - Have you had a cold, bronchitis or other respiratory infection within the last 2 weeks?  9. NO - Do you usually have a cough?  10. NO - Do you sometimes get pains in the calves of your legs when you walk?  11. NO - Do you or anyone in your family have previous history of blood clots?  12. NO - Do you or does anyone in your family have a serious bleeding problem such as prolonged bleeding following surgeries or cuts?  13. YES, but only during pregnancy - Have you ever had problems with anemia or been told to take iron  pills?  14. YES, abnormal menses for the past year - Have you had any abnormal blood loss such as black, tarry or bloody stools, or abnormal vaginal bleeding?  15. NO - Have you ever had a blood transfusion?  16. NO - Have you or any of your relatives ever had problems with anesthesia?  17. NO - Do you have sleep apnea, excessive snoring or daytime drowsiness?  18. NO - Do you have any prosthetic heart valves?  19. NO - Do you have prosthetic joints?  20. NO - Is there any chance that you may be pregnant?    Patient Active Problem List   Diagnosis     Abnormal Pap smear of cervix     Generalized anxiety disorder     Major depressive disorder, recurrent episode, moderate (H)     Obsessive-compulsive disorder     Human papillomavirus in conditions classified elsewhere and of unspecified site     Health Care Home     Family history of cystic fibrosis     Contraception     Current Outpatient Prescriptions on File Prior to Visit:  sertraline (ZOLOFT) 100 MG tablet Take 1 tablet (100 mg) by mouth daily   topiramate (TOPAMAX) 50 MG tablet Take 1 tablet (50 mg) by mouth 2 times daily   medroxyPROGESTERone (DEPO-PROVERA) 150 MG/ML vial Inject 1 mL (150 mg) into the muscle every 3 months     No current facility-administered medications on file prior to visit.     OTC products: None, except as noted above, no recent use of OTC ASA, NSAIDS or Steroids and no use of herbal medications or other supplements    Allergies   Allergen Reactions     Macrobid [Nitrofurantoin] Rash and Anaphylaxis     Penicillins      Latex Allergy: NO    Social History     Social History     Marital status: Single     Spouse name: N/A     Number of children: N/A     Years of education: N/A     Occupational History           Social History Main Topics     Smoking status: Never Smoker     Smokeless tobacco: Never Used     Alcohol use No     Drug use: No     Sexual activity: Yes     Partners: Male     Other Topics Concern     Not on file  "    Social History Narrative       REVIEW OF SYSTEMS:   Constitutional, HEENT, cardiovascular, pulmonary, GI, , musculoskeletal, neuro, skin, endocrine and psych systems are negative, except as otherwise noted.    EXAM:   Patient Vitals for the past 24 hrs:   BP Temp Temp src Pulse SpO2 Height Weight   09/20/17 1437 104/64 97.9  F (36.6  C) Oral 67 99 % 5' 4\" (162.6 cm) 133 lb 12.8 oz (60.7 kg)     Body mass index is 22.97 kg/(m^2).  GENERAL: healthy, alert and no distress  EYES: Eyes grossly normal to inspection, extraocular movements - intact, and PERRL  HENT: ear canals- normal; TMs- normal; Nose- normal; Mouth- no ulcers, no lesions  NECK: no tenderness, no adenopathy, no asymmetry, no masses, no stiffness; thyroid- normal to palpation  RESP: lungs clear to auscultation - no rales, no rhonchi, no wheezes  CV: regular rates and rhythm, normal S1 S2, no S3 or S4 and no murmur, no click or rub -  ABDOMEN: soft, no tenderness, no hepatosplenomegaly, no masses, normal bowel sounds  MS: extremities- no gross deformities noted, no edema  SKIN: no suspicious lesions, no rashes, several tattoos on upper and lower extremities  NEURO: strength and tone- normal, sensory exam- grossly normal, mentation- intact, speech- normal, reflexes- symmetric  BACK: no CVA tenderness, no paralumbar tenderness  PSYCH: Alert and oriented times 3; speech- coherent, normal rate and volume; able to articulate logical thoughts  LYMPHATICS: ant. cervical- normal, post. cervical- normal    DIAGNOSTICS:    No labs or EKG required for low risk surgery.    RISK ASSESSMENT:     Cardiovascular Risk: LOW  -Patient is able to participate in strenuous activities without chest pain.  -The patient does not have chest pain with exertion.  -Patient does not have a history of congestive heart failure.    -The patient does not have a history of stroke and does not have a history of valvular disease.    Pulmonary Risk: LOW  -In terms of risk factors for " pulmonary complication, the patient has no risk factors.    Perioperative Complications: LOW  -The patient does not have a history of bleeding or clotting problems in the past.    -The patient has not had surgery previously.    -The patient does not have a family history of any anesthesia or surgical complications.      IMPRESSION:   Reason for surgery/procedure: Abnormal uterine bleeding (since November 2016) and suspected endometrial polyp on ultrasound.    The proposed surgical procedure is considered LOW risk.    For above listed surgery and anesthesia:   Patient is at low risk for surgery/procedure and perioperative/procedure complications.    RECOMMENDATIONS:     Labs:  Hgb    Fasting:  NPO for 12 hours prior to surgery    Preop Plan:  --Approval given to proceed with proposed procedure, without further diagnostic evaluation    Medications:  Patient should hold their regular medications the morning of surgery unless otherwise instructed. Okay to take medications the night before.     Hold aspirin 7 days prior to surgery.  Hold ibuprofen for 5 days prior.    Toy Carolina MD    Please contact our office if there are any further questions or information required about this patient.

## 2017-09-25 ENCOUNTER — DOCUMENTATION ONLY (OUTPATIENT)
Dept: PSYCHOLOGY | Facility: CLINIC | Age: 32
End: 2017-09-25

## 2017-09-25 DIAGNOSIS — F33.1 MAJOR DEPRESSIVE DISORDER, RECURRENT EPISODE, MODERATE (H): ICD-10-CM

## 2017-09-25 DIAGNOSIS — F10.90 ALCOHOL USE DISORDER: ICD-10-CM

## 2017-09-25 DIAGNOSIS — F41.1 GENERALIZED ANXIETY DISORDER: ICD-10-CM

## 2017-09-25 NOTE — PROGRESS NOTES
Behavioral Health Team,    Patient is being referred for mental health services. Please advise if we are able to see patient for in house treatment or if a community option would be best.    Thank you.     Chloé  Referral Coordinator

## 2017-09-25 NOTE — PROGRESS NOTES
Ok to schedule PCT consultation with Dr. Ordonez.  It would appear that Dr. Castellon would like help with regard to medication options for managing anger problem.  First available visit with Dr. Ordonez is 10/23/17.  In advance of this visit, I think it would be helpful to obtain most recent diagnostic assessment from psychologist Ms. Stapleton has seen for therapy.  Review of the chart indicates this appears to be Dr. Figueroa Quezada in [place], but the most recent note I see is from 4/22/17, so not sure this is current.  Did update problem list with diagnoses from most recent note from Dr. Quezada, but would be good to see if we could obtain the entire diagnostic.  Did add Dr. Quezada's contact info to the care team, but I am not sure if we have a current release.      Chloé - when you reach out to Ms. Pieter to set up visit with Dr. Ordonez, can you ask about whether or not she is still seeing Dr. Quezada and obtaining a release so that Dr. Ordonez will have the benefit of seeing the most recent diagnostic in advance of meeting with Ms. Pieter?  Let me know if you have questions or if you encounter barriers with this request.  Thanks!  Lara Le, Ph.D., LP

## 2017-09-25 NOTE — LETTER
October 3, 2017      Elizabet [place]  [address] [address]        Dear Elizabet,    We recently had you in clinic and a referral was placed for you to see our in-house psychiatrist, Dr. Ordonez, for a one- time consult. We would like to offer you an appointment with him. Please contact the clinic to get this appointment scheduled.     Sincerely,    Chloé  Care Coordinator

## 2017-10-02 ENCOUNTER — MEDICAL CORRESPONDENCE (OUTPATIENT)
Dept: HEALTH INFORMATION MANAGEMENT | Facility: CLINIC | Age: 32
End: 2017-10-02

## 2017-10-03 ENCOUNTER — TRANSFERRED RECORDS (OUTPATIENT)
Dept: HEALTH INFORMATION MANAGEMENT | Facility: CLINIC | Age: 32
End: 2017-10-03

## 2018-07-23 ENCOUNTER — RECORDS - HEALTHEAST (OUTPATIENT)
Dept: GENERAL RADIOLOGY | Facility: CLINIC | Age: 33
End: 2018-07-23

## 2018-07-23 ENCOUNTER — OFFICE VISIT - HEALTHEAST (OUTPATIENT)
Dept: FAMILY MEDICINE | Facility: CLINIC | Age: 33
End: 2018-07-23

## 2018-07-23 DIAGNOSIS — T14.8XXA CONTUSION: ICD-10-CM

## 2018-07-23 DIAGNOSIS — S99.912A ANKLE INJURY, LEFT, INITIAL ENCOUNTER: ICD-10-CM

## 2018-07-23 DIAGNOSIS — S99.922A FOOT INJURY, LEFT, INITIAL ENCOUNTER: ICD-10-CM

## 2018-07-23 DIAGNOSIS — S99.922A UNSPECIFIED INJURY OF LEFT FOOT, INITIAL ENCOUNTER: ICD-10-CM

## 2018-07-23 DIAGNOSIS — S99.912A UNSPECIFIED INJURY OF LEFT ANKLE, INITIAL ENCOUNTER: ICD-10-CM

## 2019-11-06 ENCOUNTER — HEALTH MAINTENANCE LETTER (OUTPATIENT)
Age: 34
End: 2019-11-06

## 2020-02-02 NOTE — MR AVS SNAPSHOT
After Visit Summary   5/30/2017    Elizabet Stapleton    MRN: [id number]           Patient Information     Date Of Birth          1985        Visit Information        Provider Department      5/30/2017 2:50 PM Sabra Iyer [facility]        Today's Diagnoses     Encounter for surveillance of injectable contraceptive    -  1    Severe episode of recurrent major depressive disorder, without psychotic features (H)          Care Instructions    Increase zoloft 100mg daily  Follow up in 1-3 weeks    If you have thoughts about self harm or if you just need additional support and care, here are some resources for you:    Crisis Lines:    [facility] Adult Crisis:  [phone]  Crisis Connection:  [phone]  [facility] Multilingual Crisis Line:  [phone]    You can also consider going to the Urgent [facility] Center [facility] at the following address.  Walk ins are welcome:    [address] [place] [address]   [phone] (for 24 hour crisis consultation)    Monday - Friday 8:00am - 7:00pm  Saturday:  11:00am - 3:00pm  Sunday and Holidays Closed    If you feel at risk of immediate harm, go directly to the Emergency Department.            Follow-ups after your visit        Who to contact     Please call your clinic at [phone] to:    Ask questions about your health    Make or cancel appointments    Discuss your medicines    Learn about your test results    Speak to your doctor   If you have compliments or concerns about an experience at your clinic, or if you wish to file a complaint, please contact [facility] Patient Relations at [phone] or email us at [email or web address]         Additional Information About Your Visit        MyChart Information     Open Siliconhart gives you secure access to your electronic health record. If you see a primary care provider, you can also send messages to your care team and make appointments. If you have questions,  please call your primary care clinic.  If you do not have a primary care provider, please call [phone] and they will assist you.      Splore is an electronic gateway that provides easy, online access to your medical records. With Splore, you can request a clinic appointment, read your test results, renew a prescription or communicate with your care team.     To access your existing account, please contact your [facility] or call [phone] for assistance.        Care EveryWhere ID     This is your Care EveryWhere ID. This could be used by other organizations to access your [place] medical records  [id number]        Your Vitals Were     Pulse Temperature BMI (Body Mass Index)             66 98.2  F (36.8  C) (Oral) 23.76 kg/m2          Blood Pressure from Last 3 Encounters:   05/30/17 91/57   04/05/17 108/68   03/17/17 105/66    Weight from Last 3 Encounters:   05/30/17 140 lb 9.6 oz (63.8 kg)   04/05/17 136 lb (61.7 kg)   03/17/17 135 lb (61.2 kg)              We Performed the Following     HCG Qualitative Urine (UPT)  ([facility])        Primary Care Provider Office Phone # Fax #    Juanita Mullinszabeth DO J[place] [phone]       [address] [address]        Thank you!     Thank you for choosing [facility]  for your care. Our goal is always to provide you with excellent care. Hearing back from our patients is one way we can continue to improve our services. Please take a few minutes to complete the written survey that you may receive in the mail after your visit with us. Thank you!             Your Updated Medication List - Protect others around you: Learn how to safely use, store and throw away your medicines at www.disposemymeds.org.          This list is accurate as of: 5/30/17  3:42 PM.  Always use your most recent med list.                   Brand Name Dispense Instructions for use    hydrOXYzine 25 MG capsule     VISTARIL    120 capsule    Take 1 capsule (25 mg) by mouth 3 times daily as needed for itching       medroxyPROGESTERone 150 MG/ML injection    DEPO-PROVERA    0.9 mL    Inject 1 mL (150 mg) into the muscle every 3 months       order for DME     1 Units    Equipment being ordered: belly band       Prenatal Vitamin 27-0.8 MG Tabs     30 tablet    Take 1 tablet by mouth daily       sertraline 50 MG tablet    ZOLOFT    30 tablet    Take 1/2 tablet (25 mg) for 1-2 weeks, then increase to 1 tablet orally daily       SUMAtriptan 25 MG tablet    IMITREX    18 tablet    Take 1-2 tablets (25-50 mg) by mouth at onset of headache for migraine May repeat in 2 hours. Max 8 tablets/24 hours.          no

## 2020-07-31 ENCOUNTER — COMMUNICATION - HEALTHEAST (OUTPATIENT)
Dept: SCHEDULING | Facility: CLINIC | Age: 35
End: 2020-07-31

## 2020-11-29 ENCOUNTER — HEALTH MAINTENANCE LETTER (OUTPATIENT)
Age: 35
End: 2020-11-29

## 2021-03-28 ENCOUNTER — HOSPITAL ENCOUNTER (EMERGENCY)
Facility: CLINIC | Age: 36
Discharge: HOME | End: 2021-03-28
Attending: EMERGENCY MEDICINE | Admitting: EMERGENCY MEDICINE
Payer: COMMERCIAL

## 2021-03-28 VITALS
WEIGHT: 115 LBS | SYSTOLIC BLOOD PRESSURE: 106 MMHG | RESPIRATION RATE: 18 BRPM | TEMPERATURE: 97.4 F | OXYGEN SATURATION: 97 % | HEART RATE: 71 BPM | BODY MASS INDEX: 19.74 KG/M2 | DIASTOLIC BLOOD PRESSURE: 72 MMHG

## 2021-03-28 DIAGNOSIS — L02.415 ABSCESS OF RIGHT LEG EXCLUDING FOOT: ICD-10-CM

## 2021-03-28 LAB
ALBUMIN SERPL-MCNC: 3.9 G/DL (ref 3.4–5)
ALBUMIN UR-MCNC: NEGATIVE MG/DL
ALP SERPL-CCNC: 49 U/L (ref 40–150)
ALT SERPL W P-5'-P-CCNC: 21 U/L (ref 0–50)
AMORPH CRY #/AREA URNS HPF: ABNORMAL /HPF
ANION GAP SERPL CALCULATED.3IONS-SCNC: 5 MMOL/L (ref 3–14)
APPEARANCE UR: ABNORMAL
AST SERPL W P-5'-P-CCNC: 14 U/L (ref 0–45)
BACTERIA #/AREA URNS HPF: ABNORMAL /HPF
BASOPHILS # BLD AUTO: 0.1 10E9/L (ref 0–0.2)
BASOPHILS NFR BLD AUTO: 0.8 %
BILIRUB SERPL-MCNC: 0.4 MG/DL (ref 0.2–1.3)
BILIRUB UR QL STRIP: NEGATIVE
BUN SERPL-MCNC: 14 MG/DL (ref 7–30)
CALCIUM SERPL-MCNC: 8.7 MG/DL (ref 8.5–10.1)
CHLORIDE SERPL-SCNC: 110 MMOL/L (ref 94–109)
CO2 SERPL-SCNC: 27 MMOL/L (ref 20–32)
COLOR UR AUTO: YELLOW
CREAT SERPL-MCNC: 0.9 MG/DL (ref 0.52–1.04)
CRP SERPL-MCNC: <2.9 MG/L (ref 0–8)
DIFFERENTIAL METHOD BLD: ABNORMAL
EOSINOPHIL # BLD AUTO: 0.2 10E9/L (ref 0–0.7)
EOSINOPHIL NFR BLD AUTO: 3.9 %
ERYTHROCYTE [DISTWIDTH] IN BLOOD BY AUTOMATED COUNT: 11.8 % (ref 10–15)
ERYTHROCYTE [SEDIMENTATION RATE] IN BLOOD BY WESTERGREN METHOD: 6 MM/H (ref 0–20)
FLUAV RNA RESP QL NAA+PROBE: NEGATIVE
FLUBV RNA RESP QL NAA+PROBE: NEGATIVE
GFR SERPL CREATININE-BSD FRML MDRD: 84 ML/MIN/{1.73_M2}
GLUCOSE SERPL-MCNC: 94 MG/DL (ref 70–99)
GLUCOSE UR STRIP-MCNC: NEGATIVE MG/DL
GRAM STN SPEC: NORMAL
GRAM STN SPEC: NORMAL
HCG UR QL: NEGATIVE
HCT VFR BLD AUTO: 37.2 % (ref 35–47)
HGB BLD-MCNC: 12.8 G/DL (ref 11.7–15.7)
HGB UR QL STRIP: NEGATIVE
IMM GRANULOCYTES # BLD: 0 10E9/L (ref 0–0.4)
IMM GRANULOCYTES NFR BLD: 0.2 %
KETONES UR STRIP-MCNC: NEGATIVE MG/DL
LABORATORY COMMENT REPORT: NORMAL
LEUKOCYTE ESTERASE UR QL STRIP: NEGATIVE
LYMPHOCYTES # BLD AUTO: 2 10E9/L (ref 0.8–5.3)
LYMPHOCYTES NFR BLD AUTO: 31.5 %
Lab: NORMAL
MCH RBC QN AUTO: 33.2 PG (ref 26.5–33)
MCHC RBC AUTO-ENTMCNC: 34.4 G/DL (ref 31.5–36.5)
MCV RBC AUTO: 97 FL (ref 78–100)
MONOCYTES # BLD AUTO: 0.8 10E9/L (ref 0–1.3)
MONOCYTES NFR BLD AUTO: 12.3 %
MUCOUS THREADS #/AREA URNS LPF: PRESENT /LPF
NEUTROPHILS # BLD AUTO: 3.2 10E9/L (ref 1.6–8.3)
NEUTROPHILS NFR BLD AUTO: 51.3 %
NITRATE UR QL: NEGATIVE
NRBC # BLD AUTO: 0 10*3/UL
NRBC BLD AUTO-RTO: 0 /100
PH UR STRIP: 7 PH (ref 5–7)
PLATELET # BLD AUTO: 210 10E9/L (ref 150–450)
POTASSIUM SERPL-SCNC: 3.5 MMOL/L (ref 3.4–5.3)
PROT SERPL-MCNC: 7 G/DL (ref 6.8–8.8)
RBC # BLD AUTO: 3.85 10E12/L (ref 3.8–5.2)
RBC #/AREA URNS AUTO: <1 /HPF (ref 0–2)
RSV RNA SPEC QL NAA+PROBE: NORMAL
SARS-COV-2 RNA RESP QL NAA+PROBE: NEGATIVE
SODIUM SERPL-SCNC: 142 MMOL/L (ref 133–144)
SOURCE: ABNORMAL
SP GR UR STRIP: 1.02 (ref 1–1.03)
SPECIMEN SOURCE: NORMAL
SPECIMEN SOURCE: NORMAL
SQUAMOUS #/AREA URNS AUTO: 2 /HPF (ref 0–1)
TSH SERPL DL<=0.005 MIU/L-ACNC: 0.75 MU/L (ref 0.4–4)
UROBILINOGEN UR STRIP-MCNC: 2 MG/DL (ref 0–2)
WBC # BLD AUTO: 6.2 10E9/L (ref 4–11)
WBC #/AREA URNS AUTO: 1 /HPF (ref 0–5)

## 2021-03-28 PROCEDURE — 87070 CULTURE OTHR SPECIMN AEROBIC: CPT | Performed by: EMERGENCY MEDICINE

## 2021-03-28 PROCEDURE — 96374 THER/PROPH/DIAG INJ IV PUSH: CPT | Performed by: EMERGENCY MEDICINE

## 2021-03-28 PROCEDURE — 87205 SMEAR GRAM STAIN: CPT | Performed by: EMERGENCY MEDICINE

## 2021-03-28 PROCEDURE — C9803 HOPD COVID-19 SPEC COLLECT: HCPCS | Performed by: EMERGENCY MEDICINE

## 2021-03-28 PROCEDURE — 86140 C-REACTIVE PROTEIN: CPT | Performed by: EMERGENCY MEDICINE

## 2021-03-28 PROCEDURE — 81001 URINALYSIS AUTO W/SCOPE: CPT | Performed by: EMERGENCY MEDICINE

## 2021-03-28 PROCEDURE — 81025 URINE PREGNANCY TEST: CPT | Performed by: EMERGENCY MEDICINE

## 2021-03-28 PROCEDURE — 99284 EMERGENCY DEPT VISIT MOD MDM: CPT | Mod: 25 | Performed by: EMERGENCY MEDICINE

## 2021-03-28 PROCEDURE — 87636 SARSCOV2 & INF A&B AMP PRB: CPT | Performed by: EMERGENCY MEDICINE

## 2021-03-28 PROCEDURE — 85025 COMPLETE CBC W/AUTO DIFF WBC: CPT | Performed by: EMERGENCY MEDICINE

## 2021-03-28 PROCEDURE — 10060 I&D ABSCESS SIMPLE/SINGLE: CPT | Performed by: EMERGENCY MEDICINE

## 2021-03-28 PROCEDURE — 85652 RBC SED RATE AUTOMATED: CPT | Performed by: EMERGENCY MEDICINE

## 2021-03-28 PROCEDURE — 250N000011 HC RX IP 250 OP 636: Performed by: EMERGENCY MEDICINE

## 2021-03-28 PROCEDURE — 84443 ASSAY THYROID STIM HORMONE: CPT | Performed by: EMERGENCY MEDICINE

## 2021-03-28 PROCEDURE — 87076 CULTURE ANAEROBE IDENT EACH: CPT | Performed by: EMERGENCY MEDICINE

## 2021-03-28 PROCEDURE — 80053 COMPREHEN METABOLIC PANEL: CPT | Performed by: EMERGENCY MEDICINE

## 2021-03-28 PROCEDURE — 250N000013 HC RX MED GY IP 250 OP 250 PS 637: Performed by: EMERGENCY MEDICINE

## 2021-03-28 RX ORDER — ONDANSETRON 4 MG/1
4 TABLET, ORALLY DISINTEGRATING ORAL EVERY 8 HOURS PRN
Qty: 10 TABLET | Refills: 0 | Status: SHIPPED | OUTPATIENT
Start: 2021-03-28 | End: 2021-03-31

## 2021-03-28 RX ORDER — CEPHALEXIN 500 MG/1
500 CAPSULE ORAL 4 TIMES DAILY
Qty: 28 CAPSULE | Refills: 0 | Status: SHIPPED | OUTPATIENT
Start: 2021-03-28 | End: 2021-04-04

## 2021-03-28 RX ORDER — CEPHALEXIN 500 MG/1
500 CAPSULE ORAL ONCE
Status: COMPLETED | OUTPATIENT
Start: 2021-03-28 | End: 2021-03-28

## 2021-03-28 RX ORDER — ONDANSETRON 2 MG/ML
4 INJECTION INTRAMUSCULAR; INTRAVENOUS EVERY 30 MIN PRN
Status: DISCONTINUED | OUTPATIENT
Start: 2021-03-28 | End: 2021-03-28 | Stop reason: HOSPADM

## 2021-03-28 RX ADMIN — ONDANSETRON 4 MG: 2 INJECTION INTRAMUSCULAR; INTRAVENOUS at 20:22

## 2021-03-28 RX ADMIN — CEPHALEXIN 500 MG: 500 CAPSULE ORAL at 20:45

## 2021-03-28 ASSESSMENT — ENCOUNTER SYMPTOMS
COLOR CHANGE: 1
APPETITE CHANGE: 1
AGITATION: 0
LIGHT-HEADEDNESS: 0
DYSURIA: 0
COUGH: 0
FEVER: 0
MYALGIAS: 1
DIARRHEA: 0
SHORTNESS OF BREATH: 0
NAUSEA: 1
ABDOMINAL PAIN: 0
FREQUENCY: 0
WEAKNESS: 0
HEADACHES: 0
EYE DISCHARGE: 0
FATIGUE: 1
VOMITING: 0
CONFUSION: 0
UNEXPECTED WEIGHT CHANGE: 1
CHILLS: 0
ADENOPATHY: 0
SORE THROAT: 0

## 2021-03-28 NOTE — ED TRIAGE NOTES
right leg for 3 years, has not followed up with derm, red and painful, nausea, fatigue, fevers, menses since December, recent unintentional weight loss

## 2021-03-29 NOTE — DISCHARGE INSTRUCTIONS
Remove packing in 2 days.  Soak right leg in warm soapy water once a day.  If the antibiotic as prescribed  Return if increasing pain, fevers, vomiting

## 2021-03-29 NOTE — RESULT ENCOUNTER NOTE
[facility] ED discharge antibiotic (if prescribed):  Cephalexin (Keflex) 500 mg capsule, 1 capsule (500 mg) by mouth 4 times daily for 7 days.  Incision and Drainage performed in [facility] Emergency Dept [Yes / No] : Yes  No changes in treatment per [facility] ED lab result culture protocol.

## 2021-03-29 NOTE — ED PROVIDER NOTES
History     Chief Complaint   Patient presents with     Mass     right leg for 3 years, has not followed up with derm, red and painful, nausea, fatigue, fevers     HPI  Elizabet Stapleton is a 35 year old female who has had intermittent fevers for the past month.  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  She reports she is also had nausea with diminished appetite and weight loss.  She presents today with right leg pain.  She reports that she has had a bump there for the past 3 years.  Over the past few days she states that its has become more red and it is about 3 times larger than it typically is with increasing pain.  She continues to have intermittent fevers.  She has irregular menses.  She denies dysuria or urinary frequency.  She has no melena or bright red blood per rectum.  She reports some myalgias and generalized fatigue.  Patient had Covid last August, and gets repeated surveillance testing in her job in a long-term care facility.    Allergies:  Allergies   Allergen Reactions     Macrobid [Nitrofurantoin] Rash and Anaphylaxis     Penicillins        Problem List:    Patient Active Problem List    Diagnosis Date Noted     Alcohol use disorder 09/25/2017     Priority: Medium     Per Figueroa llamas, 4/22/17       Contraception 01/12/2017     Priority: Medium     1/12/2017  Plan Documentation  Service ordered Depo Provera injection (150mg IM) may be given every 3 months for one year per protoccol.  Plan and order should be renewed at a visit no later than 11/10/2017 .  MD Cande             Family history of cystic fibrosis 08/06/2015     Priority: Medium     Generalized anxiety disorder 12/06/2012     Priority: Medium     Per Figueroa llamas, Ph.D.,  4/22/17       Major depressive disorder, recurrent episode, moderate (H) 12/06/2012     Priority: Medium     Per Dr. Figueroa llamas 4/22/17        Obsessive-compulsive disorder 12/06/2012     Priority: Medium     Problem list name updated by automated process. Provider to review       Human papillomavirus in conditions classified elsewhere and of unspecified site 12/06/2012     Priority: Medium     Health Care Home 12/06/2012     Priority: Medium     Tier Level: 1tier  DX V65.8 REPLACED WITH 05523 HEALTH CARE HOME (04/08/2013)       Abnormal Pap smear of cervix      Priority: Medium     7/19/17 nl Pap with neg HPV, pt needs repeat Pap/HPV in 5 yrs.  7/27/15 ASCUS Pap with neg HPV -- repeat co-testing in 3 years  8/22/12 nl Pap but no ECC (pt pregnant), pt needs repeat Pap in 3yr  3/9/11 nl Pap  6/2/09 ASCUS Pap with negative HPV  11/11/08 COLP APPT: cervical bx and ECC negative for dysplasia  8/6/08 ASCUS Pap with + high risk HPV 35,6  3/30/06 nl Pap          Past Medical History:    Past Medical History:   Diagnosis Date     Abnormal Pap smear of cervix      Anxiety      Depressive disorder      Major depression        Past Surgical History:    Past Surgical History:   Procedure Laterality Date     HYSTEROSCOPY      and D&C.  proliferative endometrium only.  Dr SHIVAM Abraham.     NO HISTORY OF SURGERY         Family History:    Family History   Problem Relation Age of Onset     Hypertension Mother      Genetic Disorder Father         cystic fibrosis     Coronary Artery Disease Father      Genetic Disorder Sister         borderline cystic fibrosis     Coronary Artery Disease Paternal Grandmother      Cancer Paternal Grandmother      Diabetes No family hx of        Social History:  Marital Status:  Single [1]  Social History     Tobacco Use     Smoking status: Never Smoker     Smokeless tobacco: Never Used   Substance Use Topics     Alcohol use: No     Drug use: No        Medications:    cephALEXin (KEFLEX) 500 MG capsule  ondansetron (ZOFRAN ODT) 4 MG ODT tab  medroxyPROGESTERone (DEPO-PROVERA) 150 MG/ML vial  sertraline (ZOLOFT) 100 MG tablet  topiramate (TOPAMAX)  50 MG tablet          Review of Systems   Constitutional: Positive for appetite change, fatigue and unexpected weight change. Negative for chills and fever.   HENT: Negative for congestion and sore throat.    Eyes: Negative for discharge.   Respiratory: Negative for cough and shortness of breath.    Cardiovascular: Negative for chest pain and leg swelling.   Gastrointestinal: Positive for nausea. Negative for abdominal pain, diarrhea and vomiting.   Genitourinary: Positive for menstrual problem. Negative for dysuria and frequency.   Musculoskeletal: Positive for myalgias.   Skin: Positive for color change. Negative for rash.   Neurological: Negative for weakness, light-headedness and headaches.   Hematological: Negative for adenopathy.   Psychiatric/Behavioral: Negative for agitation, behavioral problems and confusion.       Physical Exam   BP: 106/72  Pulse: 71  Temp: 97.4  F (36.3  C)  Resp: 18  Weight: 52.2 kg (115 lb)  SpO2: 97 %      Physical Exam  Constitutional:       General: She is not in acute distress.     Appearance: She is well-developed.   HENT:      Head: Normocephalic and atraumatic.   Eyes:      Conjunctiva/sclera: Conjunctivae normal.      Pupils: Pupils are equal, round, and reactive to light.   Neck:      Musculoskeletal: Normal range of motion and neck supple.      Thyroid: No thyromegaly.      Trachea: No tracheal deviation.   Cardiovascular:      Rate and Rhythm: Normal rate and regular rhythm.      Heart sounds: Normal heart sounds. No murmur.   Pulmonary:      Effort: Pulmonary effort is normal. No respiratory distress.      Breath sounds: Normal breath sounds. No wheezing.   Chest:      Chest wall: No tenderness.   Abdominal:      General: There is no distension.      Palpations: Abdomen is soft.      Tenderness: There is no abdominal tenderness.   Musculoskeletal:         General: Tenderness present.        Legs:    Skin:     General: Skin is warm.      Findings: No rash.   Neurological:       Mental Status: She is alert and oriented to person, place, and time.      Sensory: No sensory deficit.   Psychiatric:         Behavior: Behavior normal.         ED Course        Procedures          The area of swelling along her leg was initially cleansed with Hibiclens.  It was infiltrated with 3 cc of 1% lidocaine.  An 11 blade was used to make a 1 cm incision over the area.  There was bloody, granular tissue under pressure.  This was removed with a forceps.  The area was cultured.  It was again cleansed and a 1/4 inch iodoform gauze was placed.       Results for orders placed or performed during the hospital encounter of 03/28/21 (from the past 24 hour(s))   CBC with platelets differential   Result Value Ref Range    WBC 6.2 4.0 - 11.0 10e9/L    RBC Count 3.85 3.8 - 5.2 10e12/L    Hemoglobin 12.8 11.7 - 15.7 g/dL    Hematocrit 37.2 35.0 - 47.0 %    MCV 97 78 - 100 fl    MCH 33.2 (H) 26.5 - 33.0 pg    MCHC 34.4 31.5 - 36.5 g/dL    RDW 11.8 10.0 - 15.0 %    Platelet Count 210 150 - 450 10e9/L    Diff Method Automated Method     % Neutrophils 51.3 %    % Lymphocytes 31.5 %    % Monocytes 12.3 %    % Eosinophils 3.9 %    % Basophils 0.8 %    % Immature Granulocytes 0.2 %    Nucleated RBCs 0 0 /100    Absolute Neutrophil 3.2 1.6 - 8.3 10e9/L    Absolute Lymphocytes 2.0 0.8 - 5.3 10e9/L    Absolute Monocytes 0.8 0.0 - 1.3 10e9/L    Absolute Eosinophils 0.2 0.0 - 0.7 10e9/L    Absolute Basophils 0.1 0.0 - 0.2 10e9/L    Abs Immature Granulocytes 0.0 0 - 0.4 10e9/L    Absolute Nucleated RBC 0.0    Comprehensive metabolic panel   Result Value Ref Range    Sodium 142 133 - 144 mmol/L    Potassium 3.5 3.4 - 5.3 mmol/L    Chloride 110 (H) 94 - 109 mmol/L    Carbon Dioxide 27 20 - 32 mmol/L    Anion Gap 5 3 - 14 mmol/L    Glucose 94 70 - 99 mg/dL    Urea Nitrogen 14 7 - 30 mg/dL    Creatinine 0.90 0.52 - 1.04 mg/dL    GFR Estimate 84 >60 mL/min/[1.73_m2]    GFR Estimate If Black >90 >60 mL/min/[1.73_m2]    Calcium 8.7 8.5  - 10.1 mg/dL    Bilirubin Total 0.4 0.2 - 1.3 mg/dL    Albumin 3.9 3.4 - 5.0 g/dL    Protein Total 7.0 6.8 - 8.8 g/dL    Alkaline Phosphatase 49 40 - 150 U/L    ALT 21 0 - 50 U/L    AST 14 0 - 45 U/L   TSH with free T4 reflex   Result Value Ref Range    TSH 0.75 0.40 - 4.00 mU/L   Erythrocyte sedimentation rate auto   Result Value Ref Range    Sed Rate 6 0 - 20 mm/h   CRP Inflammation   Result Value Ref Range    CRP Inflammation <2.9 0.0 - 8.0 mg/L   Symptomatic Influenza A/B & SARS-CoV2 (COVID-19) Virus PCR Multiplex    Specimen: Nasopharyngeal   Result Value Ref Range    Flu A/B & SARS-COV-2 PCR Source Nasopharyngeal     SARS-CoV-2 PCR Result NEGATIVE     Influenza A PCR Negative NEG^Negative    Influenza B PCR Negative NEG^Negative    Respiratory Syncytial Virus PCR (Note)     Flu A/B & SARS-CoV-2 PCR Comment (Note)    UA reflex to Microscopic and Culture    Specimen: Midstream Urine   Result Value Ref Range    Color Urine Yellow     Appearance Urine Cloudy     Glucose Urine Negative NEG^Negative mg/dL    Bilirubin Urine Negative NEG^Negative    Ketones Urine Negative NEG^Negative mg/dL    Specific Gravity Urine 1.017 1.003 - 1.035    Blood Urine Negative NEG^Negative    pH Urine 7.0 5.0 - 7.0 pH    Protein Albumin Urine Negative NEG^Negative mg/dL    Urobilinogen mg/dL 2.0 0.0 - 2.0 mg/dL    Nitrite Urine Negative NEG^Negative    Leukocyte Esterase Urine Negative NEG^Negative    Source Midstream Urine     RBC Urine <1 0 - 2 /HPF    WBC Urine 1 0 - 5 /HPF    Bacteria Urine Few (A) NEG^Negative /HPF    Squamous Epithelial /HPF Urine 2 (H) 0 - 1 /HPF    Mucous Urine Present (A) NEG^Negative /LPF    Amorphous Crystals Few (A) NEG^Negative /HPF   HCG qualitative urine (UPT)   Result Value Ref Range    HCG Qual Urine Negative NEG^Negative       Medications   lidocaine 1 % 5 mL (has no administration in time range)   ondansetron (ZOFRAN) injection 4 mg (4 mg Intravenous Given 3/28/21 2022)   cephALEXin (KEFLEX) capsule  500 mg (500 mg Oral Given 3/28/21 2045)       Assessments & Plan (with Medical Decision Making)     I have reviewed the nursing notes.    I have reviewed the findings, diagnosis, plan and need for follow up with the patient.  35-year-old female who presents with a tender swollen area just lateral to her right knee for the past 3 days which is getting worse.  She also has had some nonspecific symptoms over the past month including nausea, diminished appetite, weight loss, intermittent fevers, and fatigue.  She recently has had a negative Covid test.    On arrival, patient's vital signs are normal.  She has a tender, swollen abscess just lateral to her right knee with surrounding erythema.  An incision and drainage procedure was performed.  A wick was placed.  Patient will be placed on antibiotics.    In evaluating her other symptoms, patient white count was 6.2 with a hemoglobin of 12.8 indicating no evidence of systemic infection.  A comprehensive metabolic profile shows no electrolyte or hepatic abnormalities.  Her thyroid functions are normal.  CRP and sed rate were obtained and show no evidence of inflammation within normal limits    Covid and influenza tests are negative.  Her urinalysis shows no evidence of infection, and patient is not pregnant.  Her prolonged symptoms could be related to her past Covid infection.  She does have an abscess with cellulitis, but this would not explain her symptoms for the past month.  She will be treated with antibiotics and will be given antiemetics.  He can follow-up with her primary care provider should she have a ongoing symptoms.              Discharge Medication List as of 3/28/2021  8:35 PM      START taking these medications    Details   cephALEXin (KEFLEX) 500 MG capsule Take 1 capsule (500 mg) by mouth 4 times daily for 7 days, Disp-28 capsule, R-0, Local Print      ondansetron (ZOFRAN ODT) 4 MG ODT tab Take 1 tablet (4 mg) by mouth every 8 hours as needed for nausea,  Disp-10 tablet, R-0, Local Print             Final diagnoses:   Abscess of right leg excluding foot       3/28/2021   [facility] EMERGENCY DEPT     Raymundo Polk MD  03/28/21 [id number]

## 2021-04-06 ENCOUNTER — TELEPHONE (OUTPATIENT)
Dept: EMERGENCY MEDICINE | Facility: CLINIC | Age: 36
End: 2021-04-06

## 2021-04-06 LAB
BACTERIA SPEC CULT: ABNORMAL
Lab: ABNORMAL
SPECIMEN SOURCE: ABNORMAL

## 2021-04-06 NOTE — TELEPHONE ENCOUNTER
[facility] Emergency Department Lab result notification:    Reason for call  Wound/Abscess Culture  Lab Result  Final Abscess culture (Right leg) report on 4/6/21  [facility] Emergency Dept discharge antibiotic prescribed: Cephalexin (Keflex) 500 mg capsule, 1 capsule (500 mg) by mouth 4 times daily for 7 days.  #1. Bacteria, Cutibacterium (Propionibacterium) acnes ,  is [NOT TESTED] to antibiotic.  Incision and Drainage performed in the [place] ED: Yes  Recommendations in treatment per [facility] ED lab result culture protocol  ED visit Date: 3/28/21  Symptoms reported at ED visit Mass        right leg for 3 years, has not followed up with derm, red and painful, nausea, fatigue, fevers      HPI  Elizabet Stapleton is a 35 year old female who has had intermittent fevers for the past month.  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  She reports she is also had nausea with diminished appetite and weight loss.  She presents today with right leg pain.  She reports that she has had a bump there for the past 3 years.  Over the past few days she states that its has become more red and it is about 3 times larger than it typically is with increasing pain.  She continues to have intermittent fevers.  She has irregular menses.  She denies dysuria or urinary frequency.  She has no melena or bright red blood per rectum.  She reports some myalgias and generalized fatigue.  Patient had Covid last August, and gets repeated surveillance testing in her job in a long-term care facility.     Miscellaneous information      Current symptoms  5:34 Spoke with patient she states that her leg is better, she has been seen in another ER and had this looked at again, the cephalexin was stopped and started on Bactrim   She states that there is still a mass at the site. She states that the area is still really hard and can be seen through the wound.  It Is not red or hot looks healthy and has some pain that is described as achey but not as bad as when she was at the ER.  Did advise that patient follow up with her clinic or to return to ER if something should worsen before being seen with her primary  Patient stated understanding  Recommendations/Instructions  Patient notified of lab result and treatment recommendations.   Contact your PCP clinic or return to the Emergency department if your:    Symptoms return.    Symptoms worsen or other concerning symptom's.    [RN Name]  Lizett Rothman  [employer] [facility]  Emergency Dept Lab Result RN  Ph# [phone]

## 2021-05-29 ENCOUNTER — RECORDS - HEALTHEAST (OUTPATIENT)
Dept: ADMINISTRATIVE | Facility: CLINIC | Age: 36
End: 2021-05-29

## 2021-05-30 ENCOUNTER — RECORDS - HEALTHEAST (OUTPATIENT)
Dept: ADMINISTRATIVE | Facility: CLINIC | Age: 36
End: 2021-05-30

## 2021-06-01 ENCOUNTER — RECORDS - HEALTHEAST (OUTPATIENT)
Dept: ADMINISTRATIVE | Facility: CLINIC | Age: 36
End: 2021-06-01

## 2021-06-03 ENCOUNTER — RECORDS - HEALTHEAST (OUTPATIENT)
Dept: ADMINISTRATIVE | Facility: CLINIC | Age: 36
End: 2021-06-03

## 2021-06-26 NOTE — PROGRESS NOTES
Progress Notes by Stanislav Alegria DO at 7/23/2018  1:40 PM     Author: Stanislav Alegria DO Service: -- Author Type: Physician    Filed: 7/23/2018  3:15 PM Encounter Date: 7/23/2018 Status: Signed    : Stanislav Alegria DO (Physician)       Chief Complaint   Patient presents with   ? Foot Injury     dropped tv on L/t foot x 3 days        History of Present Illness: Rooming staff notes reviewed.  Chief concern of patient is pain in her left foot and ankle region, after dropping a television she was carrying with a friend onto her left foot and ankle 3 days ago.  She has pain especially in the lateral forefoot area and third fourth and fifth toes, with pain also in her anterior ankle region.  She has been elevating her left lower extremity as much as possible, and trying to minimize ambulation.    Review of systems: See history of present illness, otherwise negative.     Current Outpatient Prescriptions   Medication Sig Dispense Refill   ? ibuprofen (ADVIL,MOTRIN) 600 MG tablet Take 1 tablet (600 mg total) by mouth every 6 (six) hours as needed for pain. 20 tablet 0   ? prenatal vitamin iron-folic acid 27mg-0.8mg (PRENATAL S) 27 mg iron- 800 mcg Tab tablet Take 1 tablet by mouth daily.       No current facility-administered medications for this visit.        Past Medical History:   Diagnosis Date   ? Atypical squamous cell changes of undetermined significance (ASCUS) on cervical cytology with positive high risk human papilloma virus (HPV)    ? Depression    ? History of being screened for lead exposure 8/4/15 prenatal    - 8/4/15 prenatal: venous lead <1.9   ? Menarche    ? Type A blood, Rh positive 8/4/15 prenatal    - 8/4/15 prenatal: blood type A POS, Ab Screen neg      Past Surgical History:   Procedure Laterality Date   ? Denies surgical history        Social History     Social History   ? Marital status: Single     Spouse name: N/A   ? Number of children: N/A   ? Years of education: N/A     Social History Main  Topics   ? Smoking status: Never Smoker   ? Smokeless tobacco: Never Used   ? Alcohol use Yes      Comment: once a month   ? Drug use: No   ? Sexual activity: Not Asked     Other Topics Concern   ? None     Social History Narrative       History   Smoking Status   ? Never Smoker   Smokeless Tobacco   ? Never Used      Exam:   Blood pressure 98/60, pulse 86, temperature 98  F (36.7  C), temperature source Oral, resp. rate 18, SpO2 99 %, not currently breastfeeding.    EXAM:   General: Vital signs reviewed.  Patient is in no acute appearing distress when sitting in exam room chair.  Breathing appears nonlabored.  Patient is alert and oriented ×3.  Left lower extremity exam is notable for moderate edema throughout the dorsal left foot, with mildly dark ecchymosis to the distal one half of left foot on dorsal side, which includes the third fourth and fifth toes.  She is tender throughout the area of ecchymosis.  She has pain in her distal forefoot and third fourth and fifth toes with movement in any direction, and with both active and passive dorsiflexion of foot she has discomfort in the anterior ankle region.  She has an superficial abrasion measuring about 1.5 x 3 cm over her anterior ankle, with no surrounding erythema, or abnormal drainage noted.  Patient noted decreased discomfort with the wearing of a cloth ankle support left lower extremity.    Assessment/Plan   1. Foot injury, left, initial encounter  XR Foot Left 3 or More VWS    XR Foot Left 3 or More VWS    Crutches Standard    Ankle support    ibuprofen (ADVIL,MOTRIN) 600 MG tablet    CANCELED: XR Foot Left 3 or More VWS   2. Ankle injury, left, initial encounter  XR Ankle Left 3 or More VWS    XR Ankle Left 3 or More VWS    Crutches Standard    Ankle support    ibuprofen (ADVIL,MOTRIN) 600 MG tablet    CANCELED: XR Ankle Left 3 or More VWS   3. Contusion  Crutches Standard    Ankle support    ibuprofen (ADVIL,MOTRIN) 600 MG tablet    Left foot and ankle.        Patient Instructions   Also see info below. Elevate left foot/ankle when able to. Be seen again in 7-10 days if symptoms are not better, sooner if feeling any worse.      Foot Contusion  You have a contusion. This is also called a bruise. There is swelling and some bleeding under the skin, but no broken bones. This injury generally takes a few days to a few weeks to heal.  During that time, the bruise will typically change in color from reddish, to purple-blue, to greenish-yellow, then to yellow-brown.  Home care    Elevate the foot to reduce pain and swelling. As much as possible, sit or lie down with the foot raised about the level of your heart. This is especially important during the first 48 hours.    Ice the foot to help reduce pain and swelling. Wrap a cold source (ice pack or ice cubes in a plastic bag) in a thin towel. Apply to the bruised area for 20 minutes every 1 to 2 hours the first day. Continue this 3 to 4 times a day until the pain and swelling goes away.    Unless another medicine was prescribed, you can take acetaminophen, ibuprofen, or naproxen to control pain. (If you have chronic liver or kidney disease or ever had a stomach ulcer or gastrointestinal bleeding, talk with your healthcare provider before using these medicines.)  Follow up  Follow up with your healthcare provider or our staff as advised. Call if you are not improving within 1 to 2 weeks.  When to seek medical advice   Call your healthcare provider right away if you have any of the following:    Increased pain or swelling    Foot or leg becomes cold, blue, numb or tingly    Signs of infection: Warmth, drainage, or increased redness or pain around the bruise    Inability to move the injured foot     Frequent bruising for unknown reasons  Date Last Reviewed: 2/1/2017 2000-2017 [employer] [address] [place] [address], [place] [address]. All rights reserved. This information is not intended as a substitute for  professional medical care. Always follow your healthcare professional's instructions.           Stanislav Alegria, DO

## 2021-09-19 ENCOUNTER — HEALTH MAINTENANCE LETTER (OUTPATIENT)
Age: 36
End: 2021-09-19

## 2022-01-09 ENCOUNTER — HEALTH MAINTENANCE LETTER (OUTPATIENT)
Age: 37
End: 2022-01-09

## 2022-02-17 PROBLEM — Z78.9 USES BIRTH CONTROL: Status: ACTIVE | Noted: 2017-01-12

## 2022-11-21 ENCOUNTER — HEALTH MAINTENANCE LETTER (OUTPATIENT)
Age: 37
End: 2022-11-21

## 2023-01-23 ENCOUNTER — ALLIED HEALTH/NURSE VISIT (OUTPATIENT)
Dept: RESEARCH | Facility: CLINIC | Age: 38
End: 2023-01-23
Payer: COMMERCIAL

## 2023-01-23 VITALS
SYSTOLIC BLOOD PRESSURE: 96 MMHG | HEART RATE: 86 BPM | DIASTOLIC BLOOD PRESSURE: 61 MMHG | HEIGHT: 64 IN | WEIGHT: 115 LBS | OXYGEN SATURATION: 99 % | TEMPERATURE: 98 F | RESPIRATION RATE: 16 BRPM | BODY MASS INDEX: 19.63 KG/M2

## 2023-01-23 DIAGNOSIS — Z00.6 RESEARCH SUBJECT: Primary | ICD-10-CM

## 2023-01-23 PROCEDURE — 99207 PR NO CHARGE NURSE ONLY: CPT

## 2023-01-23 NOTE — PROGRESS NOTES
[place] Study Consent    Study Description: The purpose of this study is to collect sleep data for product research and development. We will compare the performance of the Smartwatch to a medical-grade Home Sleep Test (HST). We hope to learn whether the Smartwatch can be used to track sleep quality at home.        Elizabet Stapleton a 37 year old female, was on-site  today to discuss participation in the [place] sleep data collection study.     The consent form was reviewed with the patient.     The review of the study included:    Study Purpose     COVID-19 Criteria     Participant Responsibilities      Study Data and Devices    Benefits and Risks of Participation    Compensation and Costs of Participation    Voluntary Participation    Confidentiality     Injury and Legal Rights    Protocol Version: 3.0    The subject was queried in regards to her willingness to continue and her questions were answered to her satisfaction.     The patient has given her agreement to volunteer and participate in the above noted study.     The Consent  and HIPAA form version 3.0 6-JAN-2023 was signed at 09:00  on  23-JAN-2023 with the Clinical Research Unit of [facility].     A copy of the [place] consent will be placed in subject's medical record. A copy of the consent form was given to the subject today.    Study data is directly entered into Epic and [employer] per protocol.     No study procedures were done prior to Elizabet Stapleton providing informed consent.       23-JAN-2023    Maria Del Carmen Waggoner RN

## 2023-01-23 NOTE — PROGRESS NOTES
"  [place] Sleep Study Inclusion/Exclusion Criteria:    Study Name: [place]  : Shu Carrillo MD    Study Description: The purpose of this study is to collect sleep data for product research and development. We will compare the performance of the Smartwatch to a medical-grade Home Sleep Test (HST). We hope to learn whether the Smartwatch can be used to track sleep quality at home.    Protocol Version: 3.0  22-DEC-2022     Criteria #  Inclusion Criteria (ALL MUST BE YES)  YES/NO/N/A   1  Adult (age > 18 years old) Yes   2  Subject has a reliable WiFi network (examples of \"reliable\": able to video-conference call with a clear image, able to stream movies or video without interruption, play online computer games) Yes   3  Subject has access to a computer or smartphone with audiovisual capabilities (e.g., webcam and microphone/speaker*)  Yes   4  Subject is willing to comply with the study procedures    Yes         * applicable for subjects that are remotely consented and/or trained.     Criteria # Exclusion Criteria (ALL MUST BE NO) YES/NO/N/A   1  Active COVID infection   No   2  Decisionally impaired participants (no surrogate consenting is allowed)    No   3  Not understanding written and spoken English     No   4  Open wounds(s) on the wrist and/or forearm (in area where Smartwatch would be worn) No   5  Tattoos, large moles or scars on the dorsal aspect of wrist where the Smartwatch would be worn; individuals with tattoo on only one wrist may participate, if Smartwatch is worn on non-tattooed wrist    No   6  Known sensitivity or allergy to component of the Smartwatch   No   7  Planned travel across 2 or more times zones during study participation   No   8  Planned travel away from home for more than 5 days during the study period No   9  Overnight rotating shift work     No   10  Active and adherent to treatment for sleep apnea   (e.g., CPAP, dental appliance, Hypoglossal nerve stimulator)    " No   11  Plans to start treatment for apnea within the study timeframe    No   12  Overnight supplemental oxygen use   No   13    Employed by a company that develops or sells medical and/or fitness devices (e.g., ECG monitors, wearable fitness bands, sleep monitors, etc.) or are technology journalists (e.g., professional bloggers, TV, magazine, newspaper reporters, etc.) No   14    Participated in a previous sleep study that used a wrist-worn sensor device by same sponsor  No     Patient does fulfill study inclusion criteria and no exclusion criteria are found.     Shu Carrillo MD    23-JAN-2023    Maria Del Carmen Waggoner, RN

## 2023-01-23 NOTE — PROGRESS NOTES
"[facility] In-Person Study Note    Study Description: The purpose of this study is to collect sleep data for product research and development. We will compare the performance of the Smartwatch to a medical-grade Home Sleep Test (HST). We hope to learn whether the Smartwatch can be used to track sleep quality at home.       Subject ID:      SCREENING     Demographic Info  Elizabet Stapleton   1985          37 year old  female    Race:  or   Ethnicity: Non-/     Talbot Scale: OBAN Talbot: 3    Medical History:  Past Medical History:    Date    None                       Sleep Apnea Diagnosis: No    Allergies:  Allergies   Allergen Reactions     Macrobid [Nitrofurantoin] Rash and Anaphylaxis     Penicillins         Current Medications:  None    Past Surgical History:  Past Surgical History:   Procedure Laterality Date     HYSTEROSCOPY       NO HISTORY OF SURGERY       OTHER SURGICAL HISTORY                Vitals:  Time Subject Sat: 0958   BP 96/61 (BP Location: Left arm, Patient Position: Sitting)   Pulse 86   Temp 98  F (36.7  C)   Resp 16   Ht 1.626 m (5' 4\")   Wt 52.2 kg (115 lb)   SpO2 99%   BMI 19.74 kg/m         Lifestyle:  Occupation:     Do you have a Bed Partner/Co-Sleeper? None   Previous Sleep Study?: No       (If Yes) Initial AHI Score: N/A    Alcohol Use: No  Smoking History: No      Measurements & Preferences:  Dominant Hand: Left  Preferred Watch Wrist: Right     Left Wrist:  Circumference (mm): 145   Hairiness: A: Thin Hair, Low Density   Tattoos, Moles, Scars? None    Right Wrist:   Circumference (mm): 145   Hairiness: A: Thin Hair, Low Density   Tattoos, Moles, Scars? None    Was data collected?: Yes    ENROLLMENT & DEVICES      Device IDs:  Watch ID: [id number]    Watch Size: 40 mm   Band Size: S/M  Natural Notch: 4   Secure Notch: 4   Watch Setting Worn: 4   Phone ID: [id number]  Nox ID: [id number]     Has the Subject Enrolled in " the Study Brendan: Yes   Confirmation of Enrollment?: Yes   Enrollment Date: 23-JAN-2023  Smartwatch Training Completed? Yes   Smartwatch Training Date: 23-JAN-2023  HSAT Training Completed? Yes   HSAT Training Date: 23-JAN-2023 23-JAN-2023  Maria Del Carmen Waggoner RN

## 2023-01-26 ENCOUNTER — VIRTUAL VISIT (OUTPATIENT)
Dept: RESEARCH | Facility: CLINIC | Age: 38
End: 2023-01-26
Payer: COMMERCIAL

## 2023-01-26 DIAGNOSIS — Z00.6 RESEARCH SUBJECT: Primary | ICD-10-CM

## 2023-01-26 NOTE — PROGRESS NOTES
"   [place] HSAT Phone Visit    Study Description: The purpose of this study is to collect sleep data for product research and development. We will compare the performance of the Smartwatch to a medical-grade Home Sleep Test (HST). We hope to learn whether the Smartwatch can be used to track sleep quality at home.      Subject Number:     Study Day: Day 4    [place] Study Subject was called today to review HSAT expectations and requirements prior to their first night.     Are you on your home WiFi and have you been completing your Evening/Morning survey? {YES-NO  Default Yes:4444::\"Yes\"}    Reminders Checklist:   ***[] Complete Evening Task prior to HSAT Workflow/Watching the video   ***[]Make sure Red light comes on, if not DO NOT ATTEMPT  ***[] Secure nasal cannula and finger sensor with medical tape  ***[] Nox recording turned off in the morning following HSAT night   ***[] Ensure morning survey is completed for participant's data upload    Helpful Hints:  -Wear a T-shirt over the heart monitor   -Double tape device tubing/wires     Adverse Events & Con Med Assessment Performed?   [] ***    (If yes, please generate adverse event report for PI to marlys)      ***-{[facility]:[id number]::\"JAN\"}-2023    Vernon Guevara   "

## 2023-01-30 ENCOUNTER — TELEPHONE (OUTPATIENT)
Dept: RESEARCH | Facility: CLINIC | Age: 38
End: 2023-01-30

## 2023-01-30 NOTE — TELEPHONE ENCOUNTER
Study Description: The purpose of this study is to collect sleep data for product research and development. We will compare the performance of the Smartwatch to a medical-grade Home Sleep Test (HST). We hope to learn whether the Smartwatch can be used to track sleep quality at home.      Subject Number:     Study Day: Week 2    Reason for Call: HSAT Participant HSAT return today. Did not show-up to scheduled appt. Have tried to call re: research study. LVM. Unable to contact.      Were there any changes to concomitant meds or treatments? No   (If yes, update concomitant meds/treatment form on eCRF)    Were there any changes to AEs?: N/A  (If yes, update AE form on eCRF)          30-JAN-2023    KEY Shaikh RN    
normal...

## 2023-02-07 ENCOUNTER — VIRTUAL VISIT (OUTPATIENT)
Dept: RESEARCH | Facility: CLINIC | Age: 38
End: 2023-02-07
Payer: COMMERCIAL

## 2023-02-07 DIAGNOSIS — Z00.6 RESEARCH SUBJECT: Primary | ICD-10-CM

## 2023-02-07 PROCEDURE — 99207 PR NO CHARGE NURSE ONLY: CPT | Mod: 93

## 2023-02-14 ENCOUNTER — OFFICE VISIT (OUTPATIENT)
Dept: RESEARCH | Facility: CLINIC | Age: 38
End: 2023-02-14

## 2023-02-14 DIAGNOSIS — Z00.6 RESEARCH SUBJECT: Primary | ICD-10-CM

## 2023-02-14 PROCEDURE — 99207 PR NO CHARGE NURSE ONLY: CPT

## 2023-02-14 NOTE — LETTER
2/14/2023    Rosette Barone MD, MD  [facility] [address] [address]    RE: Elizabet Stapleton       Dear Colleague,     I had the pleasure of seeing Elizabet Stapleton in the [facility].   [place] Study End Note     Study Description: The purpose of this study is to collect sleep data for product research and development. We will compare the performance of the Smartwatch to a medical-grade Home Sleep Test (HST). We hope to learn whether the Smartwatch can be used to track sleep quality at home.        Study Termination/Completion Date: 8-FEB-2023  Reason for Termination (If Applicable): Other (Specify): Early termination: unable to contact regarding study procedure and protocol   , therefore unable to complete study procedures and tasks.    Subject returned all study devices today and this completes this study for the subject.    Adverse Events & Con Med Assessment Performed?   [x] None      8-FEB-2023     Maria Del Carmen Waggoner RN        Thank you for allowing me to participate in the care of your patient.      Sincerely,     Maria Del Carmen Waggoner RN     [facility]  cc:   No referring provider defined for this encounter.

## 2023-02-14 NOTE — PROGRESS NOTES
[place] Study End Note     Study Description: The purpose of this study is to collect sleep data for product research and development. We will compare the performance of the Smartwatch to a medical-grade Home Sleep Test (HST). We hope to learn whether the Smartwatch can be used to track sleep quality at home.        Study Termination/Completion Date: 8-FEB-2023  Reason for Termination (If Applicable): Other (Specify): Early termination: unable to contact regarding study procedure and protocol   , therefore unable to complete study procedures and tasks.    Subject returned all study devices today and this completes this study for the subject.    Adverse Events & Con Med Assessment Performed?   [x] None      8-FEB-2023     Maria Del Carmen Waggoner RN

## 2023-02-16 ENCOUNTER — TELEPHONE (OUTPATIENT)
Dept: FAMILY MEDICINE | Facility: CLINIC | Age: 38
End: 2023-02-16
Payer: COMMERCIAL

## 2023-02-16 DIAGNOSIS — O09.90 SUPERVISION OF HIGH RISK PREGNANCY, ANTEPARTUM: Primary | ICD-10-CM

## 2023-02-16 NOTE — TELEPHONE ENCOUNTER
Tried calling pt but [facility] is full.    Pt can schedule NOB with Dr Sandoval on Monday, 2/27/23 in the morning or afternoon.  She should also have a formal dating u/s and could do on same day before or after u/s./Maria Del Carmen De Luna RN BSN

## 2023-02-16 NOTE — TELEPHONE ENCOUNTER
[facility] phone call message- general phone call:    Reason for call: She needs a call back re she ws seen in the ER and was told she is 8 weeks pregnant she needs to schedule a OB appointment she doesn't care if she sees a male or female provider.      Action desired: call back.    Return call needed: Yes    OK to leave a message on voice mail? Yes    Advised patient to response may take up to 2 business days: Yes    Primary language: English      needed? No    Call taken on February 16, 2023 at 10:35 AM by Jennifer Chew

## 2023-02-17 ENCOUNTER — TELEPHONE (OUTPATIENT)
Dept: RESEARCH | Facility: CLINIC | Age: 38
End: 2023-02-17
Payer: COMMERCIAL

## 2023-02-17 NOTE — TELEPHONE ENCOUNTER
[place] HSAT Results Phone Call  Study Description: The purpose of this study is to collect sleep data for product research and development. We will compare the performance of the Smartwatch to a medical-grade Home Sleep Test (HST). We hope to learn whether the Smartwatch can be used to track sleep quality at home.      Elizabet Stapleton was called today to review results of her Home Sleep Test (HSAT).     A voicemail was left with the significance of the results. I also informed them that a copy of the results are available in their chart for review by their provider.     Additional Comments: Lowest O2 83%- approx. 12 minutes; HR     AHI Score: AHI less than 5  night 1 only: scores 1.8 and 1.7    Results:  NIGHT 1  Was it scorable?: Yes     Channel Presence: Scored and all 6 channels present  Select Missing Channels: N/A     (If applicable)      Analysis Start Date: 1/26/23   Analysis Duration: 6hr 27min  Analysis Start Time: 11:44pm       Analysis Stop Time: 6:12am   Est Total Sleep Time: 5 hr 25 min      NIGHT 1 Scorer 1 Scorer 2   Respiratory Parameters   Apnea + Hypopneas (AH)   Total    1.8 /h   1.7 /h   Supine    1.3 /h   1 /h   Non-Supine   2.5 /h   2.5 /h   Count   10    9        Total Total    Obstructive (OA)    0.9 /h   0.6 /h   Hypopneas    0.9 /h   1.1 /h   Central Apnea Hypopnea (CA+CH)    0 /h   0 /h   Oxygen Saturation (SpO2)   Oxygen Desaturation Index (BERKLEY)    1.5 /h   1.5 /h   Minimum SpO2    83 %   83 %   SpO2 Duration < 88%    1.6 %   1.6 %   Average Desat Drop    4.5 %   4.5 %   Position & Analysis Time   Supine (in TST) Duration    182.1 min   182.1 min       NIGHT 2  Was it scorable?: No       17-FEB-2023    Peggy Clement PA-C

## 2023-02-24 ENCOUNTER — HOSPITAL ENCOUNTER (OUTPATIENT)
Dept: ULTRASOUND IMAGING | Facility: HOSPITAL | Age: 38
Discharge: HOME | End: 2023-02-24
Attending: STUDENT IN AN ORGANIZED HEALTH CARE EDUCATION/TRAINING PROGRAM | Admitting: STUDENT IN AN ORGANIZED HEALTH CARE EDUCATION/TRAINING PROGRAM
Payer: COMMERCIAL

## 2023-02-24 DIAGNOSIS — R30.0 DYSURIA: Primary | ICD-10-CM

## 2023-02-24 DIAGNOSIS — O09.90 SUPERVISION OF HIGH RISK PREGNANCY, ANTEPARTUM: ICD-10-CM

## 2023-02-24 PROCEDURE — 76801 OB US < 14 WKS SINGLE FETUS: CPT

## 2023-02-24 NOTE — TELEPHONE ENCOUNTER
Pt states that she would like her appt before March.  Scheduled her to see Dr Lima on Monday, 2/27/23 with OB educator at 1:40 PM and Dr Lima at 2:30 PM.  She will see Dr Munguia for the remainder of her pregnancy.    Pt requested u/s for today and scheduled u/s for today, 2/24/23 at 3:00 PM at the [facility].    Pt reports that she has been having foul smelling/cloudy urine for the last few days and was in the hospital for bilateral pyelonephritis and sepsis in October. She also states that she has been having N&V and would like some medication to help.  Scheduled her an appt to see Dr Harris today at 4:30 PM.  Routed note to Dr Harris. /Maria Del Carmen De Luna RN BSN

## 2023-02-24 NOTE — TELEPHONE ENCOUNTER
Pt returned call but call was dropped before pt could be scheduled.      [facility] for pt.  Pt could see Dr Munguia on Wed, 3/8/23 starting at 1:00 PM with OB educator and 2:30 PM with Dr Munguia.     She can do her dating u/s with Dr Heaton on 3/2 or 3/9 in the morning or at [facility] or [facility]./Maria Del Carmen De Luna RN BSN

## 2023-02-28 ENCOUNTER — TELEPHONE (OUTPATIENT)
Dept: FAMILY MEDICINE | Facility: CLINIC | Age: 38
End: 2023-02-28
Payer: COMMERCIAL

## 2023-04-16 ENCOUNTER — HEALTH MAINTENANCE LETTER (OUTPATIENT)
Age: 38
End: 2023-04-16

## 2023-08-03 ENCOUNTER — APPOINTMENT (OUTPATIENT)
Dept: CT IMAGING | Facility: HOSPITAL | Age: 38
End: 2023-08-03
Payer: COMMERCIAL

## 2023-08-03 ENCOUNTER — LAB REQUISITION (OUTPATIENT)
Dept: LAB | Facility: CLINIC | Age: 38
End: 2023-08-03
Payer: COMMERCIAL

## 2023-08-03 ENCOUNTER — HOSPITAL ENCOUNTER (INPATIENT)
Facility: HOSPITAL | Age: 38
LOS: 2 days | Discharge: LEFT BEFORE DISCHARGE | End: 2023-08-05
Attending: EMERGENCY MEDICINE | Admitting: FAMILY MEDICINE
Payer: COMMERCIAL

## 2023-08-03 DIAGNOSIS — M25.569 PAIN IN UNSPECIFIED KNEE: ICD-10-CM

## 2023-08-03 DIAGNOSIS — M00.9 JOINT INFECTION (H): ICD-10-CM

## 2023-08-03 LAB
ABO/RH(D): NORMAL
ALBUMIN SERPL BCG-MCNC: 3.9 G/DL (ref 3.5–5.2)
ALP SERPL-CCNC: 106 U/L (ref 35–104)
ALT SERPL W P-5'-P-CCNC: 32 U/L (ref 0–50)
ANION GAP SERPL CALCULATED.3IONS-SCNC: 11 MMOL/L (ref 7–15)
ANTIBODY SCREEN: NEGATIVE
APPEARANCE FLD: ABNORMAL
AST SERPL W P-5'-P-CCNC: 28 U/L (ref 0–45)
ATRIAL RATE - MUSE: 100 BPM
BASE EXCESS BLDV CALC-SCNC: 2.5 MMOL/L
BASOPHILS # BLD AUTO: 0 10E3/UL (ref 0–0.2)
BASOPHILS NFR BLD AUTO: 0 %
BILIRUB SERPL-MCNC: 0.2 MG/DL
BUN SERPL-MCNC: 9.9 MG/DL (ref 6–20)
CALCIUM SERPL-MCNC: 9.5 MG/DL (ref 8.6–10)
CELL COUNT BODY FLUID SOURCE: ABNORMAL
CHLORIDE SERPL-SCNC: 104 MMOL/L (ref 98–107)
COLOR FLD: YELLOW
CREAT SERPL-MCNC: 0.69 MG/DL (ref 0.51–0.95)
CRP SERPL-MCNC: 157.3 MG/L
CRYSTALS SNV MICRO: NORMAL
DEPRECATED HCO3 PLAS-SCNC: 24 MMOL/L (ref 22–29)
DIASTOLIC BLOOD PRESSURE - MUSE: NORMAL MMHG
EOSINOPHIL # BLD AUTO: 0 10E3/UL (ref 0–0.7)
EOSINOPHIL NFR BLD AUTO: 0 %
ERYTHROCYTE [DISTWIDTH] IN BLOOD BY AUTOMATED COUNT: 12 % (ref 10–15)
ERYTHROCYTE [SEDIMENTATION RATE] IN BLOOD BY WESTERGREN METHOD: 42 MM/HR (ref 0–20)
GFR SERPL CREATININE-BSD FRML MDRD: >90 ML/MIN/1.73M2
GLUCOSE SERPL-MCNC: 92 MG/DL (ref 70–99)
GRAM STAIN RESULT: NORMAL
GRAM STAIN RESULT: NORMAL
HCG UR QL: NEGATIVE
HCO3 BLDV-SCNC: 27 MMOL/L (ref 24–30)
HCT VFR BLD AUTO: 35 % (ref 35–47)
HGB BLD-MCNC: 11.7 G/DL (ref 11.7–15.7)
HOLD SPECIMEN: NORMAL
HOLD SPECIMEN: NORMAL
IMM GRANULOCYTES # BLD: 0.1 10E3/UL
IMM GRANULOCYTES NFR BLD: 1 %
INTERPRETATION ECG - MUSE: NORMAL
LACTATE SERPL-SCNC: 0.6 MMOL/L (ref 0.7–2)
LIPASE SERPL-CCNC: 19 U/L (ref 13–60)
LYMPHOCYTES # BLD AUTO: 1.3 10E3/UL (ref 0.8–5.3)
LYMPHOCYTES NFR BLD AUTO: 10 %
LYMPHOCYTES NFR FLD MANUAL: 3 %
MCH RBC QN AUTO: 32.8 PG (ref 26.5–33)
MCHC RBC AUTO-ENTMCNC: 33.4 G/DL (ref 31.5–36.5)
MCV RBC AUTO: 98 FL (ref 78–100)
MONOCYTES # BLD AUTO: 1.4 10E3/UL (ref 0–1.3)
MONOCYTES NFR BLD AUTO: 10 %
MONOS+MACROS NFR FLD MANUAL: 10 %
NEUTROPHILS # BLD AUTO: 10.4 10E3/UL (ref 1.6–8.3)
NEUTROPHILS NFR BLD AUTO: 79 %
NEUTS BAND NFR FLD MANUAL: 88 %
NRBC # BLD AUTO: 0 10E3/UL
NRBC BLD AUTO-RTO: 0 /100
OXYHGB MFR BLDV: 26.8 % (ref 70–75)
P AXIS - MUSE: 86 DEGREES
PCO2 BLDV: 44 MM HG (ref 35–50)
PH BLDV: 7.4 [PH] (ref 7.35–7.45)
PLATELET # BLD AUTO: 292 10E3/UL (ref 150–450)
PO2 BLDV: 18 MM HG (ref 25–47)
POTASSIUM SERPL-SCNC: 4 MMOL/L (ref 3.4–5.3)
PR INTERVAL - MUSE: 138 MS
PROT SERPL-MCNC: 7 G/DL (ref 6.4–8.3)
QRS DURATION - MUSE: 58 MS
QT - MUSE: 334 MS
QTC - MUSE: 430 MS
R AXIS - MUSE: 91 DEGREES
RBC # BLD AUTO: 3.57 10E6/UL (ref 3.8–5.2)
SAO2 % BLDV: 27.6 % (ref 70–75)
SODIUM SERPL-SCNC: 139 MMOL/L (ref 136–145)
SPECIMEN EXPIRATION DATE: NORMAL
SYSTOLIC BLOOD PRESSURE - MUSE: NORMAL MMHG
T AXIS - MUSE: 61 DEGREES
TROPONIN T SERPL HS-MCNC: <6 NG/L
VENTRICULAR RATE- MUSE: 100 BPM
WBC # BLD AUTO: 13.2 10E3/UL (ref 4–11)
WBC # FLD AUTO: ABNORMAL /UL

## 2023-08-03 PROCEDURE — 250N000011 HC RX IP 250 OP 636

## 2023-08-03 PROCEDURE — 74174 CTA ABD&PLVS W/CONTRAST: CPT

## 2023-08-03 PROCEDURE — 82805 BLOOD GASES W/O2 SATURATION: CPT

## 2023-08-03 PROCEDURE — 86850 RBC ANTIBODY SCREEN: CPT | Performed by: STUDENT IN AN ORGANIZED HEALTH CARE EDUCATION/TRAINING PROGRAM

## 2023-08-03 PROCEDURE — 87070 CULTURE OTHR SPECIMN AEROBIC: CPT | Mod: ORL

## 2023-08-03 PROCEDURE — 87075 CULTR BACTERIA EXCEPT BLOOD: CPT | Mod: ORL

## 2023-08-03 PROCEDURE — 80053 COMPREHEN METABOLIC PANEL: CPT

## 2023-08-03 PROCEDURE — 81025 URINE PREGNANCY TEST: CPT

## 2023-08-03 PROCEDURE — 85652 RBC SED RATE AUTOMATED: CPT | Performed by: PHYSICIAN ASSISTANT

## 2023-08-03 PROCEDURE — 258N000003 HC RX IP 258 OP 636: Performed by: EMERGENCY MEDICINE

## 2023-08-03 PROCEDURE — 89051 BODY FLUID CELL COUNT: CPT | Mod: ORL

## 2023-08-03 PROCEDURE — 36415 COLL VENOUS BLD VENIPUNCTURE: CPT

## 2023-08-03 PROCEDURE — 20606 DRAIN/INJ JOINT/BURSA W/US: CPT | Mod: LT

## 2023-08-03 PROCEDURE — 87077 CULTURE AEROBIC IDENTIFY: CPT | Mod: ORL

## 2023-08-03 PROCEDURE — 96365 THER/PROPH/DIAG IV INF INIT: CPT | Mod: 59

## 2023-08-03 PROCEDURE — 36415 COLL VENOUS BLD VENIPUNCTURE: CPT | Performed by: PHYSICIAN ASSISTANT

## 2023-08-03 PROCEDURE — 86901 BLOOD TYPING SEROLOGIC RH(D): CPT | Performed by: STUDENT IN AN ORGANIZED HEALTH CARE EDUCATION/TRAINING PROGRAM

## 2023-08-03 PROCEDURE — 250N000011 HC RX IP 250 OP 636: Mod: JZ | Performed by: FAMILY MEDICINE

## 2023-08-03 PROCEDURE — 250N000011 HC RX IP 250 OP 636: Mod: JZ

## 2023-08-03 PROCEDURE — 93005 ELECTROCARDIOGRAM TRACING: CPT

## 2023-08-03 PROCEDURE — 83605 ASSAY OF LACTIC ACID: CPT

## 2023-08-03 PROCEDURE — 85025 COMPLETE CBC W/AUTO DIFF WBC: CPT

## 2023-08-03 PROCEDURE — 87205 SMEAR GRAM STAIN: CPT | Mod: ORL

## 2023-08-03 PROCEDURE — 83690 ASSAY OF LIPASE: CPT

## 2023-08-03 PROCEDURE — 84484 ASSAY OF TROPONIN QUANT: CPT

## 2023-08-03 PROCEDURE — 89060 EXAM SYNOVIAL FLUID CRYSTALS: CPT | Mod: ORL

## 2023-08-03 PROCEDURE — 73701 CT LOWER EXTREMITY W/DYE: CPT | Mod: RT

## 2023-08-03 PROCEDURE — 86140 C-REACTIVE PROTEIN: CPT | Performed by: PHYSICIAN ASSISTANT

## 2023-08-03 PROCEDURE — 250N000011 HC RX IP 250 OP 636: Performed by: EMERGENCY MEDICINE

## 2023-08-03 PROCEDURE — 73201 CT UPPER EXTREMITY W/DYE: CPT | Mod: LT

## 2023-08-03 PROCEDURE — 96375 TX/PRO/DX INJ NEW DRUG ADDON: CPT

## 2023-08-03 PROCEDURE — 250N000013 HC RX MED GY IP 250 OP 250 PS 637

## 2023-08-03 PROCEDURE — 120N000001 HC R&B MED SURG/OB

## 2023-08-03 PROCEDURE — 87040 BLOOD CULTURE FOR BACTERIA: CPT

## 2023-08-03 PROCEDURE — 99285 EMERGENCY DEPT VISIT HI MDM: CPT | Mod: 25

## 2023-08-03 RX ORDER — ONDANSETRON 4 MG/1
4 TABLET, ORALLY DISINTEGRATING ORAL EVERY 6 HOURS PRN
Status: DISCONTINUED | OUTPATIENT
Start: 2023-08-03 | End: 2023-08-05 | Stop reason: HOSPADM

## 2023-08-03 RX ORDER — AMOXICILLIN 250 MG
1 CAPSULE ORAL 2 TIMES DAILY
Status: DISCONTINUED | OUTPATIENT
Start: 2023-08-03 | End: 2023-08-05 | Stop reason: HOSPADM

## 2023-08-03 RX ORDER — VANCOMYCIN HYDROCHLORIDE 1 G/200ML
1000 INJECTION, SOLUTION INTRAVENOUS EVERY 12 HOURS
Status: DISCONTINUED | OUTPATIENT
Start: 2023-08-03 | End: 2023-08-05

## 2023-08-03 RX ORDER — CEFAZOLIN SODIUM 1 G/50ML
1250 SOLUTION INTRAVENOUS ONCE
Status: COMPLETED | OUTPATIENT
Start: 2023-08-03 | End: 2023-08-03

## 2023-08-03 RX ORDER — MEROPENEM 1 G/1
1 INJECTION, POWDER, FOR SOLUTION INTRAVENOUS EVERY 8 HOURS
Status: DISCONTINUED | OUTPATIENT
Start: 2023-08-03 | End: 2023-08-05 | Stop reason: HOSPADM

## 2023-08-03 RX ORDER — ACETAMINOPHEN 325 MG/1
650 TABLET ORAL 3 TIMES DAILY
Status: DISCONTINUED | OUTPATIENT
Start: 2023-08-03 | End: 2023-08-04

## 2023-08-03 RX ORDER — NALOXONE HYDROCHLORIDE 1 MG/ML
0.4 INJECTION INTRAMUSCULAR; INTRAVENOUS; SUBCUTANEOUS ONCE
Status: COMPLETED | OUTPATIENT
Start: 2023-08-03 | End: 2023-08-03

## 2023-08-03 RX ORDER — IBUPROFEN 600 MG/1
600 TABLET, FILM COATED ORAL EVERY 6 HOURS PRN
COMMUNITY
Start: 2023-07-31

## 2023-08-03 RX ORDER — ONDANSETRON 2 MG/ML
4 INJECTION INTRAMUSCULAR; INTRAVENOUS EVERY 6 HOURS PRN
Status: DISCONTINUED | OUTPATIENT
Start: 2023-08-03 | End: 2023-08-05 | Stop reason: HOSPADM

## 2023-08-03 RX ORDER — AMOXICILLIN 250 MG
2 CAPSULE ORAL 2 TIMES DAILY
Status: DISCONTINUED | OUTPATIENT
Start: 2023-08-03 | End: 2023-08-05 | Stop reason: HOSPADM

## 2023-08-03 RX ORDER — ACETAMINOPHEN 325 MG/1
650 TABLET ORAL EVERY 6 HOURS PRN
Status: DISCONTINUED | OUTPATIENT
Start: 2023-08-03 | End: 2023-08-03

## 2023-08-03 RX ORDER — MEROPENEM 1 G/1
1 INJECTION, POWDER, FOR SOLUTION INTRAVENOUS ONCE
Status: COMPLETED | OUTPATIENT
Start: 2023-08-03 | End: 2023-08-03

## 2023-08-03 RX ORDER — IOPAMIDOL 755 MG/ML
90 INJECTION, SOLUTION INTRAVASCULAR ONCE
Status: COMPLETED | OUTPATIENT
Start: 2023-08-03 | End: 2023-08-03

## 2023-08-03 RX ORDER — PROCHLORPERAZINE 25 MG
25 SUPPOSITORY, RECTAL RECTAL EVERY 12 HOURS PRN
Status: DISCONTINUED | OUTPATIENT
Start: 2023-08-03 | End: 2023-08-05 | Stop reason: HOSPADM

## 2023-08-03 RX ORDER — ACETAMINOPHEN 650 MG/1
650 SUPPOSITORY RECTAL EVERY 6 HOURS PRN
Status: DISCONTINUED | OUTPATIENT
Start: 2023-08-03 | End: 2023-08-03

## 2023-08-03 RX ORDER — ACETAMINOPHEN 650 MG/1
650 SUPPOSITORY RECTAL 3 TIMES DAILY
Status: DISCONTINUED | OUTPATIENT
Start: 2023-08-03 | End: 2023-08-04

## 2023-08-03 RX ORDER — ENOXAPARIN SODIUM 100 MG/ML
40 INJECTION SUBCUTANEOUS EVERY 24 HOURS
Status: DISCONTINUED | OUTPATIENT
Start: 2023-08-03 | End: 2023-08-05 | Stop reason: HOSPADM

## 2023-08-03 RX ORDER — PROCHLORPERAZINE MALEATE 10 MG
10 TABLET ORAL EVERY 6 HOURS PRN
Status: DISCONTINUED | OUTPATIENT
Start: 2023-08-03 | End: 2023-08-05 | Stop reason: HOSPADM

## 2023-08-03 RX ORDER — LIDOCAINE 40 MG/G
CREAM TOPICAL
Status: DISCONTINUED | OUTPATIENT
Start: 2023-08-03 | End: 2023-08-05 | Stop reason: HOSPADM

## 2023-08-03 RX ORDER — KETOROLAC TROMETHAMINE 30 MG/ML
30 INJECTION, SOLUTION INTRAMUSCULAR; INTRAVENOUS EVERY 6 HOURS PRN
Status: DISCONTINUED | OUTPATIENT
Start: 2023-08-03 | End: 2023-08-05 | Stop reason: HOSPADM

## 2023-08-03 RX ADMIN — MEROPENEM 1 G: 1 INJECTION, POWDER, FOR SOLUTION INTRAVENOUS at 22:31

## 2023-08-03 RX ADMIN — MEROPENEM 1 G: 1 INJECTION, POWDER, FOR SOLUTION INTRAVENOUS at 14:52

## 2023-08-03 RX ADMIN — VANCOMYCIN HYDROCHLORIDE 1000 MG: 1 INJECTION, SOLUTION INTRAVENOUS at 22:24

## 2023-08-03 RX ADMIN — KETOROLAC TROMETHAMINE 30 MG: 30 INJECTION INTRAMUSCULAR; INTRAVENOUS at 19:00

## 2023-08-03 RX ADMIN — HYDROMORPHONE HYDROCHLORIDE 1 MG: 1 INJECTION, SOLUTION INTRAMUSCULAR; INTRAVENOUS; SUBCUTANEOUS at 14:49

## 2023-08-03 RX ADMIN — IOPAMIDOL 90 ML: 755 INJECTION, SOLUTION INTRAVENOUS at 21:19

## 2023-08-03 RX ADMIN — VANCOMYCIN HYDROCHLORIDE 1250 MG: 5 INJECTION, POWDER, LYOPHILIZED, FOR SOLUTION INTRAVENOUS at 14:55

## 2023-08-03 RX ADMIN — SENNOSIDES AND DOCUSATE SODIUM 1 TABLET: 50; 8.6 TABLET ORAL at 22:20

## 2023-08-03 RX ADMIN — ENOXAPARIN SODIUM 40 MG: 40 INJECTION SUBCUTANEOUS at 22:20

## 2023-08-03 RX ADMIN — ACETAMINOPHEN 650 MG: 325 TABLET ORAL at 22:19

## 2023-08-03 RX ADMIN — NALOXONE HYDROCHLORIDE 0.4 MG: 1 INJECTION PARENTERAL at 15:47

## 2023-08-03 ASSESSMENT — ACTIVITIES OF DAILY LIVING (ADL)
ADLS_ACUITY_SCORE: 35
DEPENDENT_IADLS:: INDEPENDENT
ADLS_ACUITY_SCORE: 36
ADLS_ACUITY_SCORE: 35

## 2023-08-03 ASSESSMENT — ENCOUNTER SYMPTOMS
FEVER: 1
JOINT SWELLING: 1
SHORTNESS OF BREATH: 1
BACK PAIN: 1
CHILLS: 1

## 2023-08-03 NOTE — H&P
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 She is being admitted for concern of septic joints secondary to IVDU w/poss. Bacteremia. Also concern for endocarditis given IVDU history and septic joint symptomology.    #Septic Joint - R Knee, L wrist  #Pain  -Vancomycin pharmacy to dose, had 1x dose in ED  -Meropenem 1 g qh8 IV, received 1x dose in ED  -Ibuprofen 600 mg  -Toradol 30 mg one time  -Supportive pain management - ice, elevation with pillows  -Schedule tylenol-Supportive pain management - ice, elevation with pillows  -Schedule tylenol  -Adverse reaction to dilaudid, avoid opioid pain meds  -Synovial fluid cultures - pending  -Bcx x2 - pending    -ortho following, plan to CT w/contrast L wrist and R knee. Poss. Surgery tomorrow, NPO @midnight    #Poss endocarditis  -Consider JERO if becomes febrile, new heart murmur, or + Blood cultures    #Constipation  -Senna, docusate    #Hx of IVDU  -UDS     Diet: Combination Diet Regular Diet Adult , NPO @ midnight per ortho   DVT Prophylaxis: Enoxaparin (Lovenox) SQ  Delgado Catheter: Not present  Fluids: None  Lines: None     Cardiac Monitoring: None  Code Status: Full Code      Clinically Significant Risk Factors Present on Admission                                Disposition Plan      Expected Discharge Date: 08/09/2023      Destination: home with family          The patient's care was discussed with the Attending Physician, Dr. Balderas .      Jeevan De Los Santos MD  Hospitalist Service  [facility]  Securely message with [employer] (more info)  Text page via [employer] Paging/Directory   ______________________________________________________________________    Chief Complaint   Joint pains    History of Present  Called alternate number and left VM to try to reach pt "Illness   Elizabet Stapleton is a 38 year old female who states she has been having severe pain in her L wrist and R knee for the past week, as well as feeling fever, chest pain, some shortness of breath, cramping abdominal and upper back pain. Also endorses some recent constipation, however had a BM this morning that was normal. She was seen this AM (08/03/2023) at [facility] for painful joints (L wrist and R knee), at which time the joints were aspirated and the fluid was cloudy. She was then sent to the ED to be admitted for concern of septic joints.      Of note, she received dilaudid in the ED today and had an adverse reaction, felt \"itchy and like I was going to pass out\". Was given narcan.     Social Hx:  Lives with her grandmother currently, states she feels safe in that environment. She is not currently working. Her last known use of IV drugs was June 2nd according to patient, which she states she was using heroin and methamphetamine. Also notes 1/2 ppd smoking history over the last month. No frequent alcohol use but states she had \"one beer\" last PM.           Past Medical History    Past Medical History:   Diagnosis Date    Abnormal Pap smear of cervix     Anxiety     Depressive disorder     Major depression        Past Surgical History   Past Surgical History:   Procedure Laterality Date    HYSTEROSCOPY      and D&C.  proliferative endometrium only.  Dr SHIVAM Abraham.       Prior to Admission Medications   Prior to Admission Medications   Prescriptions Last Dose Informant Patient Reported? Taking?   ibuprofen (ADVIL/MOTRIN) 600 MG tablet 8/3/2023 at 0500  Yes Yes   Sig: Take 600 mg by mouth every 6 hours as needed      Facility-Administered Medications: None        Review of Systems    The 10 point Review of Systems is negative other than noted in the HPI or here.     Physical Exam   Vital Signs: Temp: 98.5  F (36.9  C) Temp src: Oral BP: 135/85 Pulse: 95   Resp: 18 SpO2: 100 % O2 Device: None (Room air)  "   Weight: 117 lbs 8 oz    Physical Exam  Constitutional:       General: She is in acute distress.   HENT:      Mouth/Throat:      Mouth: Mucous membranes are moist.   Eyes:      Extraocular Movements: Extraocular movements intact.      Pupils: Pupils are equal, round, and reactive to light.   Cardiovascular:      Rate and Rhythm: Regular rhythm. Tachycardia present.      Pulses: Normal pulses.      Heart sounds: Normal heart sounds.   Pulmonary:      Effort: Pulmonary effort is normal.      Breath sounds: Normal breath sounds.   Abdominal:      General: Abdomen is flat. Bowel sounds are normal.   Musculoskeletal:         General: Swelling and tenderness present.      Comments: R knee and L wrist with noted swelling and erythema around surgical incision sites.   Neurological:      General: No focal deficit present.      Mental Status: She is alert and oriented to person, place, and time.   Psychiatric:         Mood and Affect: Mood normal.         Behavior: Behavior normal.           Medical Decision Making         Data     I have personally reviewed the following data over the past 24 hrs:    13.2 (H)  \   11.7   / 292     139 104 9.9 /  92   4.0 24 0.69 \     ALT: 32 AST: 28 AP: 106 (H) TBILI: 0.2   ALB: 3.9 TOT PROTEIN: 7.0 LIPASE: 19     Trop: <6 BNP: N/A     Procal: N/A CRP: 157.30 (H) Lactic Acid: 0.6 (L)         Imaging results reviewed over the past 24 hrs:   No results found for this or any previous visit (from the past 24 hour(s)).    Jeevan De Los Santos MD  PGY-1  [facility]  [facility]   August 3, 2023

## 2023-08-03 NOTE — MEDICATION SCRIBE - ADMISSION MEDICATION HISTORY
Medication Scribe Admission Medication History    Admission medication history is complete. The information provided in this note is only as accurate as the sources available at the time of the update.    Medication reconciliation/reorder completed by provider prior to medication history? No    Information Source(s): Patient via in-person    Pertinent Information: Patient reports taking only one medication. (Recent RX  for Ibuprofen 600 mg.)    Changes made to PTA medication list:  Added: Ibuprofen 600   Deleted: Depo-Provera, Zoloft, Topamax  Changed: None    Medication Affordability:  Not including over the counter (OTC) medications, was there a time in the past 3 months when you did not take your medications as prescribed because of cost?: No    Allergies reviewed with patient and updates made in EHR: yes    Medication History Completed By: Eleuterio Sethi 8/3/2023 2:36 PM    Prior to Admission medications    Medication Sig Last Dose Taking? Auth Provider Long Term End Date   ibuprofen (ADVIL/MOTRIN) 600 MG tablet Take 600 mg by mouth every 6 hours as needed 8/3/2023 at 0500 Yes Reported, Patient

## 2023-08-03 NOTE — ED TRIAGE NOTES
"Patient presents to ER for referral from [facility].  C/o left hand, wrist, arm swelling and right knee swelling.  Started Saturday 7/29th.  Left wrist and right knee were tapped by [facility] today.  C/o numbness/tingling in right hand/finger area. Visibly swollen.      Denies trauma, bug bites. Afebrile.     Last IV drug use \"beginning of June\".      Triage Assessment       Row Name 08/03/23 6268       Triage Assessment (Adult)    Airway WDL WDL       Respiratory WDL    Respiratory WDL WDL       Skin Circulation/Temperature WDL    Skin Circulation/Temperature WDL temperature    Skin Temperature cool       Cardiac WDL    Cardiac WDL WDL       Peripheral/Neurovascular WDL    Peripheral Neurovascular WDL neurovascular assessment upper       LUE Neurovascular Assessment    Color LUE pale    Sensation LUE numbness present;tingling present  left hand/fingers       Cognitive/Neuro/Behavioral WDL    Cognitive/Neuro/Behavioral WDL WDL                    "

## 2023-08-03 NOTE — ED NOTES
"[facility] ED Handoff Report    ED Chief Complaint: Left arm pain    ED Diagnosis:  (M00.9) Joint infection (H)  Comment:   Plan:        PMH:    Past Medical History:   Diagnosis Date    Abnormal Pap smear of cervix     Anxiety     Depressive disorder     Major depression         Code Status:  No Order     Falls Risk: No Band: Not applicable    Current Living Situation/Residence: lives in a house and lives with her mother.    Elimination Status: Continent: Yes     Activity Level: SBA    Patients Preferred Language:  English     Needed: No    Vital Signs:  BP (!) 145/89   Pulse 97   Temp 98.1  F (36.7  C) (Tympanic)   Resp 18   Ht 1.651 m (5' 5\")   Wt 53.3 kg (117 lb 8 oz)   SpO2 100%   BMI 19.55 kg/m       Cardiac Rhythm: Sinus tachycardia    Pain Score: 10/10    Is the Patient Confused:  No    Last Food or Drink: 08/03/23 at 0930    Focused Assessment:  Pt's left hand and left forearm are swollen, red, and very painful.    Tests Performed: Done: Labs    Treatments Provided:  Dilaudid, Meropenem, and Vancomycin    Family Dynamics/Concerns: No    Family Updated On Visitor Policy: Yes    Plan of Care Communicated to Family: Yes    Who Was Updated about Plan of Care: Pt's significant other and her daughter.    Belongings Checklist Done and Signed by Patient: Yes    Medications sent with patient: None    No covid test ordered.    Additional Information: Pt still needs to give a urine HCG sample prior to CT.  Pt also has ultrasound ordered.    RN: Donavan Olsen RN   8/3/2023 4:31 PM       "

## 2023-08-03 NOTE — PHARMACY-VANCOMYCIN DOSING SERVICE
Pharmacy Vancomycin Initial Note  Date of Service August 3, 2023  Patient's  1985  38 year old, female    Indication: Bone and Joint Infection    Current estimated CrCl = Estimated Creatinine Clearance: 94.8 mL/min (based on SCr of 0.69 mg/dL).    Creatinine for last 3 days  8/3/2023:  2:16 PM Creatinine 0.69 mg/dL    Recent Vancomycin Level(s) for last 3 days  No results found for requested labs within last 3 days.      Vancomycin IV Administrations (past 72 hours)                     vancomycin (VANCOCIN) 1,250 mg in sodium chloride 0.9 % 250 mL intermittent infusion (mg) 1,250 mg New Bag 23 1455                    Nephrotoxins and other renal medications (From now, onward)      Start     Dose/Rate Route Frequency Ordered Stop    23 2200  vancomycin (VANCOCIN) 1000 mg in dextrose 5% 200 mL PREMIX         1,000 mg  200 mL/hr over 1 Hours Intravenous EVERY 12 HOURS 23 1849      23 1807  ketorolac (TORADOL) injection 30 mg         30 mg Intravenous EVERY 6 HOURS PRN 23 1809 23 1806            Contrast Orders - past 72 hours (72h ago, onward)      None            [employer] Prediction of Planned Initial Vancomycin Regimen    Regimen: 1000 mg IV every 12 hours.  Start time: 22:00 on 2023  Exposure target: AUC24 (range)400-600 mg/L.hr   AUC24,ss: 531 mg/L.hr  Probability of AUC24 > 400: 78 %  Ctrough,ss: 15.4 mg/L  Probability of Ctrough,ss > 20: 30 %  Probability of nephrotoxicity (Lodise CAREN ): 11 %          Plan:  Start vancomycin  1000 mg IV q12h.   Vancomycin monitoring method: AUC  Vancomycin therapeutic monitoring goal: 400-600 mg*h/L  Pharmacy will check vancomycin levels as appropriate in 1-3 Days.    Serum creatinine levels will be ordered daily for the first week of therapy and at least twice weekly for subsequent weeks.      Marco A Anguiano, [facility]

## 2023-08-03 NOTE — ED PROVIDER NOTES
EMERGENCY DEPARTMENT ENCOUNTER      NAME: Elizabet Stapleton  AGE: 38 year old female  YOB: 1985  MRN: [id number]  EVALUATION DATE & TIME: No admission date for patient encounter.    PCP: Rosette Barone    ED PROVIDER: Julienne Lema PA-C      Chief Complaint   Patient presents with    Knee Pain    Arm Pain     FINAL IMPRESSION:  1. Joint infection (H)        ED COURSE & MEDICAL DECISION MAKING:    Pertinent Labs & Imaging studies reviewed. (See chart for details)  38 year old female presents to the Emergency Department for evaluation of joint swelling and warmth.  On 7/31/2023 patient was seen in the ED for left arm injury after she developed left wrist pain from handcuffs.  Patient left the ED for her work-up was completed.  Patient was seen at [facility] on 731/23 for left arm pain.  Labs were unremarkable.  Ultrasound did not show any fluid collection.  X-ray showed no acute fractures.  Patient was discharged home and told to follow-up with orthopedics.  For the past week patient has been having increased pain, edema and warmth over the left wrist and right knee.  Patient was seen by [facility] orthopedics today where her left wrist and right knee tapped.  Fluid was drained from both joints.  Patient was sent to the ED to be evaluated.  She also complains of chest pain and shortness of breath. Patient has a history of IV drug use. Patient reports she last injected in mid July. She normally injects into her left wrist and right ankle. She has never injected into the spine. Patient's family she has been sober for a week. Vital signs reviewed and unremarkable.  Afebrile.  On exam, left wrist and hand have overlying erythema, edema and warmth.  Left wrist and hand are tender to palpation.  Decreased range of motion of the left wrist and hand secondary due to pain.  Normal strength and sensation of the left upper extremity.  Remainder of the left upper extremity is unremarkable.  Right knee is  "tender to palpation with overlying swelling and erythema and warmth.  Decreased range of motion of the right knee secondary due to pain.  Normal sensation and strength of the right lower extremity.  The remainder right lower extremity is nontender to palpation.  Right upper and left lower extremity is unremarkable.  No ecchymosis, lacerations or abrasions.  Cranial nerves II through XII intact.  Chest wall is nontender to palpation.  Cardiac and pulmonary exams unremarkable. Spine is nontender to palpation.    WBC shows an increased white blood cell count of 13.2.  CMP is unremarkable.  Lactic acid was nonelevated.  Lipase was unremarkable.  UA is pending.  Blood cultures are pending.  Initial troponin was nonelevated.  Inflammatory markers are pending. Imaging is pending.  EKG shows no ischemic changes.  Vancomycin and Merrem were started due to concern of widespread join infection.  Patient was given a dose of Dilaudid. Upon recheck patient appeared to be more sedated than suspected after 1 dose of dilaudid.  Narcan was given.  Patient returned to normal.  Upon waking patient reports that she \"very sensitive to narcotics\".  Orthopedics was consulted and saw the patient in the ED.  Orthopedics will see the patient continue continue to manage the patient while she is admitted.  I spoke with Dr. Bullock who agrees to admit the patient.  Patient was updated on the plan.  Patient agrees with plan.  All questions answered.      ED COURSE:   1:36 PM I introduced myself to the patient, obtained patient history, performed a physical exam, and discussed plan for ED workup including potential diagnostic laboratory/imaging studies and interventions.    1:45 PM I staffed the patient with Dr. Mosqueda  2:39 PM PA for [facility] at bedside. He recommended IR guided US to get culture of fluid in the wrist.   4:09 PM I checked on the patient. Patient was more sedated than suspected after one dose of dilaudid. Narcan was given. " "Patient returned to normal. Upon awaking, patient reports she is \"very sensitive to narcotics\".   4:28 PM I checked on the patient. Patient resting comfortably. Patient agrees with the plan. All questions answered.        At the conclusion of the encounter I discussed the results of all of the tests and the disposition. The questions were answered. The patient or family acknowledged understanding and was agreeable with the care plan.     0 minutes of critical care time       Additional Documentation    History:  Supplemental history from: Documented in chart, if applicable  External Record(s) reviewed: Documented in chart, if applicable.    Work Up:  Chart documentation includes differential considered and any EKGs or imaging interpreted by provider.  In additional to work up documented, I considered the following work up: Documented in chart, if applicable.    External consultation:  Discussion of management with another provider: Documented in chart, if applicable    Complicating factors:  Care impacted by chronic illness: Mental Health and Smoking / Nicotine Use  Care affected by social determinants of health: Alcohol Abuse and/or Recreational Drug Use    Disposition considerations: Admit.      MEDICATIONS GIVEN IN THE EMERGENCY:  Medications   sodium chloride (PF) 0.9% PF flush 3 mL (has no administration in time range)   sodium chloride (PF) 0.9% PF flush 3 mL (has no administration in time range)   vancomycin (VANCOCIN) 1,250 mg in sodium chloride 0.9 % 250 mL intermittent infusion (has no administration in time range)   meropenem (MERREM) 1 g vial to attach to  mL bag (1 g Intravenous $New Bag 8/3/23 5549)   HYDROmorphone (DILAUDID) injection 1 mg (1 mg Intravenous $Given 8/3/23 4222)   naloxone (NARCAN) injection 0.4 mg (0.4 mg Intravenous $Given 8/3/23 8976)       NEW PRESCRIPTIONS STARTED AT TODAY'S ER VISIT  New Prescriptions    No medications on file "       =================================================================    HPI    Patient information was obtained from: Patient     Use of : N/A       Elizabet Stapleton is a 38 year old female with a pertinent history of IV drug use, alcohol abuse, anxiety and depression who presents to this ED walk in for evaluation of possible infection    Per chart review: Patient was seen at [facility] on 7/31/23 for left arm injury. She presented for evaluation of left wrist and forearm pain that she states began while handcuffed while in police custody 2 nights ago. Exam shows stable vital signs, tenderness over the left wrist and distal forearm, distal CMS intact. Given history and exam findings, x-rays of the left wrist and forearm were ordered but unfortunate the patient chose to leave without notifying nursing staff prior to receiving these.     The patient was also seen in [facility] Emergency Department on 7/31/23 presenting with left arm pain. The patient reported she was arrested on Saturday 07/29/2023, and felt that the officers may have twisted her arm. Pain did not start until she got to the [facility] skilled nursing. Pain has been progressively getting worse, limiting her ability to move her fingers and hands. Patient states that she feels increased swelling, and decreased sensation in the hands. Patient also states that her pain is radiating up through to her shoulder. . Labs included BMP and were normal.  POC US soft tissue showed no fluid collection and XR showed evaluation of the middle and distal phalanges is limited as the patient was unable to fully extend the fingers due to pain. No acute, displaced fracture is identified. Normal joint spacing and alignment.  Patient was discharged to home with new prescription(s) for ibuprofen and plan for out-patient follow up with PCP and Orthopedics     Patient presents to ER for referral from [place] [facility]. She has swelling in her left hand, left wrist, and right knee  swelling. The pain and swelling started 7/29 after the patient was arrested. Left wrist and right knee were tapped by [place] today after they removed fluid they told the patient to go to the ED.     The patient is an IV drg user and notes that she has only injected into her left hand and right foot. She can can not move her hand at all due to pain. She endorses fever, chills, right foot pain, left ankle swelling, and right knee swelling. The patient endorses associated chest pain, shortness of breath, and back pain. The pain is worse with excertion and the pain seems to take her breath away.     She denies abdominal pain, nausea, vomiting, diarrhea.       REVIEW OF SYSTEMS   Review of Systems   Constitutional:  Positive for chills and fever (subjective).   Respiratory:  Positive for shortness of breath.    Cardiovascular:  Positive for chest pain.   Musculoskeletal:  Positive for back pain and joint swelling.   All other systems reviewed and are negative.       PAST MEDICAL HISTORY:  Past Medical History:   Diagnosis Date    Abnormal Pap smear of cervix     Anxiety     Depressive disorder     Major depression        PAST SURGICAL HISTORY:  Past Surgical History:   Procedure Laterality Date    HYSTEROSCOPY      and D&C.  proliferative endometrium only.  Dr SHIVAM Abraham.     CURRENT MEDICATIONS:    ibuprofen (ADVIL/MOTRIN) 600 MG tablet      ALLERGIES:  Allergies   Allergen Reactions    Macrobid [Nitrofurantoin] Rash and Anaphylaxis    Penicillins        FAMILY HISTORY:  Family History   Problem Relation Age of Onset    Hypertension Mother     Genetic Disorder Father         cystic fibrosis    Coronary Artery Disease Father     Genetic Disorder Sister         borderline cystic fibrosis    Coronary Artery Disease Paternal Grandmother     Cancer Paternal Grandmother     Diabetes No family hx of     Cystic Fibrosis Father        SOCIAL HISTORY:   Social History     Socioeconomic History    Marital status: Single  "  Occupational History    Occupation:    Tobacco Use    Smoking status: Never    Smokeless tobacco: Never   Substance and Sexual Activity    Alcohol use: Yes     Comment: Alcoholic Drinks/day: once a month    Drug use: No    Sexual activity: Yes     Partners: Male       VITALS:  /70   Pulse 96   Temp 98.1  F (36.7  C) (Tympanic)   Resp 18   Ht 1.651 m (5' 5\")   Wt 53.3 kg (117 lb 8 oz)   SpO2 100%   BMI 19.55 kg/m      PHYSICAL EXAM    Physical Exam  Vitals and nursing note reviewed.   Constitutional:       General: She is not in acute distress.     Appearance: Normal appearance. She is not ill-appearing, toxic-appearing or diaphoretic.   HENT:      Head: Atraumatic.      Right Ear: External ear normal.      Left Ear: External ear normal.      Nose: Nose normal.      Mouth/Throat:      Mouth: Mucous membranes are moist.   Eyes:      Conjunctiva/sclera: Conjunctivae normal.      Pupils: Pupils are equal, round, and reactive to light.   Cardiovascular:      Rate and Rhythm: Normal rate and regular rhythm.      Pulses: Normal pulses.      Heart sounds: Normal heart sounds. No murmur heard.     No friction rub. No gallop.   Pulmonary:      Effort: Pulmonary effort is normal. No respiratory distress.      Breath sounds: Normal breath sounds. No stridor. No wheezing, rhonchi or rales.   Chest:      Chest wall: No tenderness.   Abdominal:      Tenderness: There is no abdominal tenderness. There is no guarding or rebound.   Musculoskeletal:      Cervical back: Normal range of motion.      Comments: Right knee is tender to palpation with overlying edema, erythema and warmth.  Decreased range of motion of the right  knee secondary due to pain.  Normal sensation and strength of the right lower extremity.  Remainder of the right extremity is nontender to palpation. Left wrist and hand is swollen with overlying erythema and warmth.  Decreased range of motion of the left wrist and hand secondary due to pain. "  Normal sensation and strength of the left upper extremity.  Right upper extremity is unremarkable.  Chest wall is nontender to palpation.  Cervical, thoracic, lumbar, sacral paraspinal muscles and spinous process are nontender to palpation.    Skin:     General: Skin is dry.      Capillary Refill: Capillary refill takes less than 2 seconds.      Findings: No rash.   Neurological:      General: No focal deficit present.      Mental Status: She is alert and oriented to person, place, and time. Mental status is at baseline.      GCS: GCS eye subscore is 4. GCS verbal subscore is 5. GCS motor subscore is 6.      Cranial Nerves: Cranial nerves 2-12 are intact.      Sensory: Sensation is intact.      Motor: Motor function is intact.      Coordination: Coordination is intact.      Gait: Gait is intact.   Psychiatric:         Mood and Affect: Mood normal.         Thought Content: Thought content normal.          LAB:  All pertinent labs reviewed and interpreted.  Labs Ordered and Resulted from Time of ED Arrival to Time of ED Departure   COMPREHENSIVE METABOLIC PANEL - Abnormal       Result Value    Sodium 139      Potassium 4.0      Chloride 104      Carbon Dioxide (CO2) 24      Anion Gap 11      Urea Nitrogen 9.9      Creatinine 0.69      Calcium 9.5      Glucose 92      Alkaline Phosphatase 106 (*)     AST 28      ALT 32      Protein Total 7.0      Albumin 3.9      Bilirubin Total 0.2      GFR Estimate >90     LACTIC ACID WHOLE BLOOD - Abnormal    Lactic Acid 0.6 (*)    BLOOD GAS VENOUS - Abnormal    pH Venous 7.40      pCO2 Venous 44      pO2 Venous 18 (*)     Bicarbonate Venous 27      Base Excess/Deficit (+/-) 2.5      Oxyhemoglobin Venous 26.8 (*)     O2 Sat, Venous 27.6 (*)    CBC WITH PLATELETS AND DIFFERENTIAL - Abnormal    WBC Count 13.2 (*)     RBC Count 3.57 (*)     Hemoglobin 11.7      Hematocrit 35.0      MCV 98      MCH 32.8      MCHC 33.4      RDW 12.0      Platelet Count 292      % Neutrophils 79      %  Lymphocytes 10      % Monocytes 10      % Eosinophils 0      % Basophils 0      % Immature Granulocytes 1      NRBCs per 100 WBC 0      Absolute Neutrophils 10.4 (*)     Absolute Lymphocytes 1.3      Absolute Monocytes 1.4 (*)     Absolute Eosinophils 0.0      Absolute Basophils 0.0      Absolute Immature Granulocytes 0.1      Absolute NRBCs 0.0     LIPASE - Normal    Lipase 19     TROPONIN T, HIGH SENSITIVITY - Normal    Troponin T, High Sensitivity <6     ROUTINE UA WITH MICROSCOPIC   CRYSTAL ID SYNOVIAL FLUID   ERYTHROCYTE SEDIMENTATION RATE AUTO   CRP INFLAMMATION   HCG QUALITATIVE URINE   TYPE AND SCREEN, ADULT    ABO/RH(D) A POS      Antibody Screen Negative      SPECIMEN EXPIRATION DATE [id number]     BLOOD CULTURE   URINE CULTURE   BLOOD CULTURE   BLOOD CULTURE   AEROBIC BACTERIAL CULTURE ROUTINE   ANAEROBIC BACTERIAL CULTURE ROUTINE   ABO/RH TYPE AND SCREEN   JOINT FLUID EXAM   CELL COUNT WITH DIFFERENTIAL FLUID        RADIOLOGY:  Reviewed all pertinent imaging. Please see official radiology report.  CTA Chest Abdomen Pelvis w Contrast    (Results Pending)   CT Wrist Left w Contrast    (Results Pending)   CT Knee Right w Contrast    (Results Pending)   US Joint Injection Aspiration Intermediate Left    (Results Pending)       EKG:    Performed at: 1413    Impression:   Sinus rhythm   Rightward axis  Septal infarct, age undetermined  Abnormal ECG  Rate: 100  Rhythm: sinus  Axis: 91  NC Interval: 138  QRS Interval: 58  QTc Interval: 430  Comparison: NA  Dr. Mosqueda have independently reviewed and interpreted the EKG(s) documented above.    PROCEDURES: None     I, Nile Wilson, am serving as a scribe to document services personally performed by Julienne Lema PA-C, based on my observation and the provider's statements to me. I, Julienne Lema PA-C, attest that Nile Wilson is acting in a scribe capacity, has observed my performance of the services and has documented them in accordance with my  direction.    Julienne Lema PA-C  [facility] EMERGENCY DEPARTMENT  [facility] [facility] [id number] [phone]     Julienne Lema PA-C  08/08/23 0802

## 2023-08-03 NOTE — ED PROVIDER NOTES
"Emergency Department Midlevel Supervisory Note     I personally saw the patient and performed a substantive portion of the visit including all aspects of the medical decision making.    ED Course:  1:45 PM Julienne Lema PA-C staffed patient with me. I agree with their assessment and plan of management, and I will see the patient.  2:00 PM I met with the patient to introduce myself, gather additional history, perform my initial exam, and discuss the plan.   2:39 PM [facility] is at the patient's bedside.     Brief HPI:     Elizabet Stapleton is a 38 year old female who presents for evaluation of knee and arm pain. The patient states that her right knee, ankles, left hand and fingers on both hands became swollen on . Additionally, her back has recently begun to feel the \"same kind of ache\". She has had chills but has not taken her temperature. The patient reports that \"this is the most painful thing I've ever gotten\". Additionally, she has had chest pain and shortness of breath.     Of note, the patient reports that she has been trying to get clean from IV drug use. Additionally, she had a spontaneous  in 2023 and has since had abnormal menstrual cycles as well as cloudy and foul-smelling urine.     PHYSICAL EXAM  Vitals: /85 (BP Location: Left leg)   Pulse 95   Temp 98.5  F (36.9  C) (Oral)   Resp 18   Ht 1.651 m (5' 5\")   Wt 53.3 kg (117 lb 8 oz)   SpO2 100%   BMI 19.55 kg/m      General: Appears awake, alert, interactive. Anxious-appearing.  Eyes: Conjunctivae non-injected. Sclera anicteric.  HENT: Atraumatic, normocephalic  Neck: Supple, normal ROM  Respiratory/Chest: Respiration unlabored, no tachypnea  Abdomen: non distended  Musculoskeletal: Normal extremities. Swelling of left hand. Mild swelling of right knee and right ankle. Left hand erythematous.   Skin: Normal color. No rash or diaphoresis.  Neurologic: Alert and oriented, face symmetric, moves all extremities " spontaneously. Speech clear.  Psychiatric:  Affect normal, Judgment normal, Mood normal.         MDM:  39y/o female presented to the emergency department with concern for joint infection.  Was seen at [facility], with attempted aspirations in clinic.  In the emergency department she does have significant erythema of the left wrist, with symptoms in multiple other joints including fingers, right knee, left ankle.  Patient is a known previous IV drug user, and and I am concerned that this could also potentially be a potential presentation of endocarditis.  She does have a significant cellulitis of the left wrist.  She is borderline septic, but her lactate is negative.  We did attempt to give her her dose of Dilaudid in the emergency department, it turns out she is quite sensitive to this, and required Narcan.  She does report that she has had the same problem with previous doses of opiate medications.  At this time, plan is for admission for IV antibiotics and further management on the floor.  7 orthopedics has evaluated the patient in the emergency department as well.      Additional Documentation     History:  Supplemental history from: Documented in chart, if applicable  External Record(s) reviewed: Documented in chart, if applicable.     Work Up:  Chart documentation includes differential considered and any EKGs or imaging interpreted by provider.  In additional to work up documented, I considered the following work up: Documented in chart, if applicable.     External consultation:  Discussion of management with another provider: Documented in chart, if applicable     Complicating factors:  Care impacted by chronic illness: N/A  Care affected by social determinants of health: N/A     Disposition considerations: Admit.       1. Joint infection (H)        Labs and Imaging:  Results for orders placed or performed during the hospital encounter of 08/03/23   Comprehensive metabolic panel   Result Value Ref Range     Sodium 139 136 - 145 mmol/L    Potassium 4.0 3.4 - 5.3 mmol/L    Chloride 104 98 - 107 mmol/L    Carbon Dioxide (CO2) 24 22 - 29 mmol/L    Anion Gap 11 7 - 15 mmol/L    Urea Nitrogen 9.9 6.0 - 20.0 mg/dL    Creatinine 0.69 0.51 - 0.95 mg/dL    Calcium 9.5 8.6 - 10.0 mg/dL    Glucose 92 70 - 99 mg/dL    Alkaline Phosphatase 106 (H) 35 - 104 U/L    AST 28 0 - 45 U/L    ALT 32 0 - 50 U/L    Protein Total 7.0 6.4 - 8.3 g/dL    Albumin 3.9 3.5 - 5.2 g/dL    Bilirubin Total 0.2 <=1.2 mg/dL    GFR Estimate >90 >60 mL/min/1.73m2   Lactic acid whole blood   Result Value Ref Range    Lactic Acid 0.6 (L) 0.7 - 2.0 mmol/L   Blood gas venous   Result Value Ref Range    pH Venous 7.40 7.35 - 7.45    pCO2 Venous 44 35 - 50 mm Hg    pO2 Venous 18 (L) 25 - 47 mm Hg    Bicarbonate Venous 27 24 - 30 mmol/L    Base Excess/Deficit (+/-) 2.5   mmol/L    Oxyhemoglobin Venous 26.8 (L) 70.0 - 75.0 %    O2 Sat, Venous 27.6 (L) 70.0 - 75.0 %   Result Value Ref Range    Lipase 19 13 - 60 U/L   Troponin T, High Sensitivity (now)   Result Value Ref Range    Troponin T, High Sensitivity <6 <=14 ng/L   CBC with platelets and differential   Result Value Ref Range    WBC Count 13.2 (H) 4.0 - 11.0 10e3/uL    RBC Count 3.57 (L) 3.80 - 5.20 10e6/uL    Hemoglobin 11.7 11.7 - 15.7 g/dL    Hematocrit 35.0 35.0 - 47.0 %    MCV 98 78 - 100 fL    MCH 32.8 26.5 - 33.0 pg    MCHC 33.4 31.5 - 36.5 g/dL    RDW 12.0 10.0 - 15.0 %    Platelet Count 292 150 - 450 10e3/uL    % Neutrophils 79 %    % Lymphocytes 10 %    % Monocytes 10 %    % Eosinophils 0 %    % Basophils 0 %    % Immature Granulocytes 1 %    NRBCs per 100 WBC 0 <1 /100    Absolute Neutrophils 10.4 (H) 1.6 - 8.3 10e3/uL    Absolute Lymphocytes 1.3 0.8 - 5.3 10e3/uL    Absolute Monocytes 1.4 (H) 0.0 - 1.3 10e3/uL    Absolute Eosinophils 0.0 0.0 - 0.7 10e3/uL    Absolute Basophils 0.0 0.0 - 0.2 10e3/uL    Absolute Immature Granulocytes 0.1 <=0.4 10e3/uL    Absolute NRBCs 0.0 10e3/uL   Extra Blood  Bank Purple Top Tube   Result Value Ref Range    Hold Specimen JIC    Result Value Ref Range    CRP Inflammation 157.30 (H) <5.00 mg/L   Adult Type and Screen   Result Value Ref Range    ABO/RH(D) A POS     Antibody Screen Negative Negative    SPECIMEN EXPIRATION DATE [id number]          I, Radha Malone, am serving as a scribe to document services personally performed by Dr. Panda DO, based on my observations and the provider's statements to me. I, Dr. Panda DO, attest that Radha Malone is acting in a scribe capacity, has observed my performance of the services and has documented them in accordance with my direction.     Dr. Panda DO  [facility] EMERGENCY DEPARTMENT  [facility] [facility] [id number] [phone]       Avery Mosqueda DO  08/03/23 4144

## 2023-08-03 NOTE — CONSULTS
"Care Management Initial Consult    General Information  Assessment completed with: Patient, Children, Other, Elizabet and daughter Pepe and Pepe's dad Kameron  Type of CM/SW Visit: Initial Assessment    Primary Care Provider verified and updated as needed: Yes   Readmission within the last 30 days: no previous admission in last 30 days      Reason for Consult: discharge planning  Advance Care Planning: Advance Care Planning Reviewed: no concerns identified          Communication Assessment  Patient's communication style: spoken language (English or Bilingual)                           Living Environment:   People in home:  (\"I am staying with my Mom at her place\".)     Current living Arrangements: homeless (\"I'm staying with my mom\")      Able to return to prior arrangements: yes       Family/Social Support:  Care provided by: self  Provides care for: no one     Parent(s), Children          Description of Support System: Supportive, Involved    Support Assessment: Adequate family and caregiver support, Adequate social supports, Patient communicates needs well met    Current Resources:   Patient receiving home care services: No     Community Resources: None  Equipment currently used at home: none  Supplies currently used at home: None    Employment/Financial:  Employment Status: unemployed     Employment/ Comments: \"no  history\"  Financial Concerns:     Referral to Financial Worker: No       Does the patient's insurance plan have a 3 day qualifying hospital stay waiver?  No    Lifestyle & Psychosocial Needs:  Social Determinants of Health     Tobacco Use: Low Risk  (2/24/2023)    Patient History     Smoking Tobacco Use: Never     Smokeless Tobacco Use: Never     Passive Exposure: Not on file   Alcohol Use: Not on file   Financial Resource Strain: Not on file   Food Insecurity: Not on file   Transportation Needs: Not on file   Physical Activity: Not on file   Stress: Not on file   Social Connections: Not " "on file   Intimate Partner Violence: Not on file   Depression: At risk (1/9/2019)    PHQ-2     PHQ-2 Score: 6   Housing Stability: Not on file       Functional Status:  Prior to admission patient needed assistance:   Dependent ADLs:: Independent, Ambulation-no assistive device  Dependent IADLs:: Independent       Mental Health Status:          Chemical Dependency Status:  Chemical Dependency Status: Past Concern  Chemical Dependency Management:  (\"Trying to get clean of IV drug use\".)          Values/Beliefs:  Spiritual, Cultural Beliefs, Mosque Practices, Values that affect care:                 Additional Information:  Elizabet is homeless, but has been staying with her mother. She is independent with ADLs and IADLs. She drives. She is \"currently unemployed\".    Unknown discharge needs at this time. Unknown yet if she will need Home IV antibiotics. May need CD resources.     Family to transport at discharge.    CM to follow for medical progression of care, discharge recommendations, and final discharge plan.    Elizabet Null RN    "

## 2023-08-03 NOTE — PHARMACY-VANCOMYCIN DOSING SERVICE
Pharmacy Vancomycin Initial Note  Date of Service August 3, 2023  Patient's  1985  38 year old, female    Indication: Sepsis    Current estimated CrCl = CrCl cannot be calculated (Patient's most recent lab result is older than the maximum 365 days allowed.).    Creatinine for last 3 days  No results found for requested labs within last 3 days.    Recent Vancomycin Level(s) for last 3 days  No results found for requested labs within last 3 days.      Vancomycin IV Administrations (past 72 hours)        No vancomycin orders with administrations in past 72 hours.                    Nephrotoxins and other renal medications (From now, onward)      Start     Dose/Rate Route Frequency Ordered Stop    23 1500  vancomycin (VANCOCIN) 1,250 mg in sodium chloride 0.9 % 250 mL intermittent infusion         1,250 mg  over 90 Minutes Intravenous ONCE 23 1420              Contrast Orders - past 72 hours (72h ago, onward)      None                 Plan:  Start vancomycin  1250 mg IV x 1 in ER   Reconsult pharmacy if further vancomycin needed    Tyra Moreau RPH

## 2023-08-04 ENCOUNTER — APPOINTMENT (OUTPATIENT)
Dept: ULTRASOUND IMAGING | Facility: HOSPITAL | Age: 38
End: 2023-08-04
Attending: PHYSICIAN ASSISTANT
Payer: COMMERCIAL

## 2023-08-04 LAB
ALBUMIN UR-MCNC: 20 MG/DL
AMPHETAMINES UR QL SCN: ABNORMAL
ANION GAP SERPL CALCULATED.3IONS-SCNC: 11 MMOL/L (ref 7–15)
APPEARANCE UR: CLEAR
BACTERIA #/AREA URNS HPF: ABNORMAL /HPF
BARBITURATES UR QL SCN: ABNORMAL
BENZODIAZ UR QL SCN: ABNORMAL
BILIRUB UR QL STRIP: NEGATIVE
BUN SERPL-MCNC: 9.4 MG/DL (ref 6–20)
BZE UR QL SCN: ABNORMAL
CALCIUM SERPL-MCNC: 8.8 MG/DL (ref 8.6–10)
CANNABINOIDS UR QL SCN: ABNORMAL
CHLORIDE SERPL-SCNC: 104 MMOL/L (ref 98–107)
COLOR UR AUTO: ABNORMAL
CREAT SERPL-MCNC: 0.67 MG/DL (ref 0.51–0.95)
CREAT SERPL-MCNC: 0.68 MG/DL (ref 0.51–0.95)
DEPRECATED HCO3 PLAS-SCNC: 23 MMOL/L (ref 22–29)
ERYTHROCYTE [DISTWIDTH] IN BLOOD BY AUTOMATED COUNT: 12 % (ref 10–15)
GFR SERPL CREATININE-BSD FRML MDRD: >90 ML/MIN/1.73M2
GFR SERPL CREATININE-BSD FRML MDRD: >90 ML/MIN/1.73M2
GLUCOSE BLDC GLUCOMTR-MCNC: 98 MG/DL (ref 70–99)
GLUCOSE SERPL-MCNC: 93 MG/DL (ref 70–99)
GLUCOSE UR STRIP-MCNC: NEGATIVE MG/DL
HCT VFR BLD AUTO: 33.1 % (ref 35–47)
HGB BLD-MCNC: 11.2 G/DL (ref 11.7–15.7)
HGB UR QL STRIP: NEGATIVE
KETONES UR STRIP-MCNC: ABNORMAL MG/DL
LEUKOCYTE ESTERASE UR QL STRIP: ABNORMAL
MCH RBC QN AUTO: 32.6 PG (ref 26.5–33)
MCHC RBC AUTO-ENTMCNC: 33.8 G/DL (ref 31.5–36.5)
MCV RBC AUTO: 96 FL (ref 78–100)
MUCOUS THREADS #/AREA URNS LPF: PRESENT /LPF
NITRATE UR QL: POSITIVE
OPIATES UR QL SCN: ABNORMAL
PCP QUAL URINE (ROCHE): ABNORMAL
PH UR STRIP: 7.5 [PH] (ref 5–7)
PLATELET # BLD AUTO: 279 10E3/UL (ref 150–450)
POTASSIUM SERPL-SCNC: 4 MMOL/L (ref 3.4–5.3)
RBC # BLD AUTO: 3.44 10E6/UL (ref 3.8–5.2)
RBC URINE: 9 /HPF
SODIUM SERPL-SCNC: 138 MMOL/L (ref 136–145)
SP GR UR STRIP: 1.01 (ref 1–1.03)
SQUAMOUS EPITHELIAL: 9 /HPF
UROBILINOGEN UR STRIP-MCNC: <2 MG/DL
WBC # BLD AUTO: 11.6 10E3/UL (ref 4–11)
WBC URINE: 61 /HPF

## 2023-08-04 PROCEDURE — 80048 BASIC METABOLIC PNL TOTAL CA: CPT

## 2023-08-04 PROCEDURE — 36415 COLL VENOUS BLD VENIPUNCTURE: CPT

## 2023-08-04 PROCEDURE — 81001 URINALYSIS AUTO W/SCOPE: CPT

## 2023-08-04 PROCEDURE — 250N000013 HC RX MED GY IP 250 OP 250 PS 637

## 2023-08-04 PROCEDURE — 120N000001 HC R&B MED SURG/OB

## 2023-08-04 PROCEDURE — 272N000710 US JOINT INJECTION ASPIRATION INTERMEDIATE LEFT

## 2023-08-04 PROCEDURE — 250N000011 HC RX IP 250 OP 636: Mod: JZ

## 2023-08-04 PROCEDURE — 80307 DRUG TEST PRSMV CHEM ANLYZR: CPT

## 2023-08-04 PROCEDURE — 99223 1ST HOSP IP/OBS HIGH 75: CPT | Mod: AI

## 2023-08-04 PROCEDURE — 82565 ASSAY OF CREATININE: CPT

## 2023-08-04 PROCEDURE — 85027 COMPLETE CBC AUTOMATED: CPT

## 2023-08-04 PROCEDURE — 87086 URINE CULTURE/COLONY COUNT: CPT

## 2023-08-04 RX ORDER — ACETAMINOPHEN 650 MG/1
650 SUPPOSITORY RECTAL 3 TIMES DAILY PRN
Status: DISCONTINUED | OUTPATIENT
Start: 2023-08-04 | End: 2023-08-05

## 2023-08-04 RX ORDER — LORAZEPAM 0.5 MG/1
0.5 TABLET ORAL ONCE
Status: COMPLETED | OUTPATIENT
Start: 2023-08-04 | End: 2023-08-05

## 2023-08-04 RX ORDER — ACETAMINOPHEN 325 MG/1
650 TABLET ORAL 3 TIMES DAILY PRN
Status: DISCONTINUED | OUTPATIENT
Start: 2023-08-04 | End: 2023-08-05

## 2023-08-04 RX ADMIN — KETOROLAC TROMETHAMINE 30 MG: 30 INJECTION INTRAMUSCULAR; INTRAVENOUS at 04:27

## 2023-08-04 RX ADMIN — KETOROLAC TROMETHAMINE 30 MG: 30 INJECTION INTRAMUSCULAR; INTRAVENOUS at 22:15

## 2023-08-04 RX ADMIN — SENNOSIDES AND DOCUSATE SODIUM 2 TABLET: 50; 8.6 TABLET ORAL at 21:24

## 2023-08-04 RX ADMIN — ACETAMINOPHEN 650 MG: 325 TABLET ORAL at 08:46

## 2023-08-04 RX ADMIN — MEROPENEM 1 G: 1 INJECTION, POWDER, FOR SOLUTION INTRAVENOUS at 06:13

## 2023-08-04 RX ADMIN — ACETAMINOPHEN 650 MG: 325 TABLET ORAL at 13:23

## 2023-08-04 RX ADMIN — MEROPENEM 1 G: 1 INJECTION, POWDER, FOR SOLUTION INTRAVENOUS at 21:24

## 2023-08-04 RX ADMIN — VANCOMYCIN HYDROCHLORIDE 1000 MG: 1 INJECTION, SOLUTION INTRAVENOUS at 12:33

## 2023-08-04 RX ADMIN — SENNOSIDES AND DOCUSATE SODIUM 2 TABLET: 50; 8.6 TABLET ORAL at 08:46

## 2023-08-04 RX ADMIN — MEROPENEM 1 G: 1 INJECTION, POWDER, FOR SOLUTION INTRAVENOUS at 13:23

## 2023-08-04 RX ADMIN — KETOROLAC TROMETHAMINE 30 MG: 30 INJECTION INTRAMUSCULAR; INTRAVENOUS at 12:31

## 2023-08-04 RX ADMIN — ENOXAPARIN SODIUM 40 MG: 40 INJECTION SUBCUTANEOUS at 21:29

## 2023-08-04 RX ADMIN — VANCOMYCIN HYDROCHLORIDE 1000 MG: 1 INJECTION, SOLUTION INTRAVENOUS at 21:30

## 2023-08-04 ASSESSMENT — ACTIVITIES OF DAILY LIVING (ADL)
ADLS_ACUITY_SCORE: 27
ADLS_ACUITY_SCORE: 25
ADLS_ACUITY_SCORE: 25
DIFFICULTY_COMMUNICATING: NO
WEAR_GLASSES_OR_BLIND: NO
ADLS_ACUITY_SCORE: 25
ADLS_ACUITY_SCORE: 25
TOILETING_ISSUES: NO
ADLS_ACUITY_SCORE: 25
ADLS_ACUITY_SCORE: 25
FALL_HISTORY_WITHIN_LAST_SIX_MONTHS: NO
WALKING_OR_CLIMBING_STAIRS_DIFFICULTY: NO
ADLS_ACUITY_SCORE: 19
ADLS_ACUITY_SCORE: 19
DIFFICULTY_EATING/SWALLOWING: NO
ADLS_ACUITY_SCORE: 25
CONCENTRATING,_REMEMBERING_OR_MAKING_DECISIONS_DIFFICULTY: NO
ADLS_ACUITY_SCORE: 25
DRESSING/BATHING_DIFFICULTY: NO
ADLS_ACUITY_SCORE: 36
CHANGE_IN_FUNCTIONAL_STATUS_SINCE_ONSET_OF_CURRENT_ILLNESS/INJURY: NO
DOING_ERRANDS_INDEPENDENTLY_DIFFICULTY: NO

## 2023-08-04 NOTE — PROGRESS NOTES
Reviewed patient's labs and vitals.  In brief, patient had a dry aspiration in Ortho urgent care earlier today.  Ultrasound was also negative for fluid collection.  Given lack of aspirate and possible cellulitis, would recommend MRI of the wrist to evaluate for fluid collection or possible infected wrist.  N.p.o. at midnight.  We will continue to follow.    Marlys Goyal MD

## 2023-08-04 NOTE — PLAN OF CARE
ORTHO PLAN OF CARE NOTE    Knee aspirate results are reviewed. Gram stain shows no organisms, 4+ WBCs. Cell count 100k WBC, 88% PMNs.  No crystals seen.    This picture remains concerning for septic arthritis though is not definitive.  Given the lack of identified bacteria on Gram stain, will continue to await culture results to determine if patient requires irrigation and debridement of her knee.  I have briefly spoken with Dr. Goyal about her wrist pain, and I believe that an MRI will be planned for further evaluation there.    Patient may eat this evening, but plan for n.p.o. at midnight for potential surgery tomorrow.  Will follow.    Ronak Borges MD ................. 8/3/2023 8:37 PM  [facility]

## 2023-08-04 NOTE — PROGRESS NOTES
"Care Management Follow Up    Length of Stay (days): 1    Expected Discharge Date: 08/09/2023     Concerns to be Addressed:  Discharge planning     Patient plan of care discussed at interdisciplinary rounds: Yes    Anticipated Discharge Disposition:  TBD     Anticipated Discharge Services:  TBD  Anticipated Discharge DME:  TBSHEILA      Additional Information:  Chart reviewed, Right knee pain-likely septic arthritis, Left wrist pain-possible septic arthritis.  Ortho following.  NPO midnight 8/4 with planned OR 8/5.    Social History:  Elizabet is homeless, but has been staying with her mother. She is independent with ADLs and IADLs. She drives. She is \"currently unemployed\".  Hx recent IVDU 6/23 Heroin, Methamphetamine.    Unknown discharge needs at this time. Unknown yet if she will need Home IV antibiotics. May need CD resources.       Ophelia Kauffman CM        "

## 2023-08-04 NOTE — PLAN OF CARE
Goal Outcome Evaluation:                 Problem: Plan of Care - These are the overarching goals to be used throughout the patient stay.    Goal: Absence of Hospital-Acquired Illness or Injury  Outcome: Progressing  Intervention: Identify and Manage Fall Risk  Recent Flowsheet Documentation  Taken 8/3/2023 1723 by Gloria Maravilla RN  Safety Promotion/Fall Prevention:   lighting adjusted   activity supervised   safety round/check completed     Problem: Plan of Care - These are the overarching goals to be used throughout the patient stay.    Goal: Optimal Comfort and Wellbeing  Outcome: Progressing      Pt arrived to the unit at 1715, alert and oriented x4, on room air. Pt rates pain 10/10 in joints, managed by scheduled and prn meds. Last dose of Toradol administered at 1900. Pt has swelling and tenderness in right knee, bilateral ankles and left wrist/hand. Pt went down to CT around 2030, lateral transfer via slide board b/c pt was in too much pain to walk. Pt visited with family at bedside. Pt is able to make her needs known. NPO at midnight for potential surgery tomorrow.     Gloria Maravilla RN.

## 2023-08-04 NOTE — CONSULTS
ORTHOPEDIC CONSULTATION    Consultation  Elizabet Stapleton,  1985, MRN [id number]    Joint infection (H) [M00.9]    PCP: Rosette Barone, None   Code status:  Full Code       Extended Emergency Contact Information  Primary Emergency Contact: Rin Stapleton  Mobile Phone: [phone]  Relation: Daughter         IMPRESSION:  Right knee pain likely septic arthritis  Left wrist pain possible septic arthritis     PLAN:  This patient was discussed with Dr. Borges/Dr. Goyal, on-call surgeon for [facility] and they are in agreement with the following plan.   - At this time no plan for surgical intervention today.  Awaiting culture results to determine if I/D knee needed, remain negative today.  May eat today, NPO at midnight  - Awaiting left wrist MRI for further evaluation of septic wrist  - WBAT upper and lower extremities    Thank you for including [facility] in the care of Elizabet Stapleton. It has been a pleasure participating in their care.        CHIEF COMPLAINT: Joint infection (H)    HISTORY OF PRESENT ILLNESS:  The patient is seen in orthopedic consultation at the request of Pete Moreno MD for left wrist and right knee pain.  The patient is a 38 year old female with PMH significant for anxiety, depression.    Today patient is experiencing pain in L wrist, R knee.  She was seen in [facility] for pain that started in knee on , in the wrist on .  Also developing mild pain in left ankle.  She had knee aspirated in the [facility] and sent for cultures and cell count.  Dry aspiration of wrist attempted.  She does not have a history of joint pains in the past.  She does have a history of incarceration, also noted IVDA as recently as .  Denies fevers/chills at this time.    ALLERGIES:   Review of patient's allergies indicates   Allergies   Allergen Reactions    Macrobid [Nitrofurantoin] Rash and Anaphylaxis    Hydromorphone Other (See Comments)     Hypersensitivity, respiratory  "depression with opiates     Penicillins          MEDICATIONS UPON ADMISSION:  Medications were reviewed.  They include:   Medications Prior to Admission   Medication Sig Dispense Refill Last Dose    ibuprofen (ADVIL/MOTRIN) 600 MG tablet Take 600 mg by mouth every 6 hours as needed   8/3/2023 at 0500         SOCIAL HISTORY:   she  reports that she has never smoked. She has never used smokeless tobacco. She reports current alcohol use. She reports that she does not use drugs.      FAMILY HISTORY:  family history includes Cancer in her paternal grandmother; Coronary Artery Disease in her father and paternal grandmother; Cystic Fibrosis in her father; Genetic Disorder in her father and sister; Hypertension in her mother.      REVIEW OF SYSTEMS:   Reviewed with patient. See HPI, otherwise negative       PHYSICAL EXAMINATION:  Vitals: /70 (BP Location: Left leg)   Pulse 76   Temp 98.3  F (36.8  C) (Oral)   Resp 16   Ht 1.651 m (5' 5\")   Wt 53.3 kg (117 lb 8 oz)   SpO2 98%   BMI 19.55 kg/m    General: On examination, the patient is resting comfortably, NAD, awake, and alert and oriented to person, place, time, and, and general circumstances   SKIN: There dorsal left wrist and hand swelling/erythema.  Mild swelling of right knee post-aspiration  Pulses:  brachial, radial, dorsalis pedis, and posterior tibial pulse is intact and equal bilaterally  Sensation: intact and equal bilaterally to the distal upper/lower extremities.  Tenderness: Dorsal hand and wrist exquisitely TTP.  ROM: Unable to demonstrate ROM at knee or wrist/fingers due to pain.  Unable to weight bear on knee    Contralateral side= Full range of motion, Negative joint instability findings, 5/5 motor groups about the joint, Non-tender.       RADIOGRAPHIC EVALUATION:  Personally reviewed  EXAM: CT KNEE RIGHT W CONTRAST  LOCATION: [facility]  DATE: 8/3/2023     INDICATION: right knee swelling and warmth  COMPARISON: " 08/03/2023 radiographs.  TECHNIQUE: IV contrast. Axial, sagittal and coronal thin-section reconstruction. Dose reduction techniques were used.   CONTRAST: isovue 370 90ml     FINDINGS:   -Moderate amount of soft tissue emphysema in stranding lateral to the knee joint consistent with recent aspiration procedure.  -There is a large joint effusion with mild synovitis. Mild soft tissue edema adjacent to the knee joint. Findings worrisome for septic arthritis. Reactive or posttraumatic etiology is considered less likely.   -No osseous erosions are evident. No osseous volume loss or periostitis. No findings to suggest osteomyelitis.  -No subcutaneous or intramuscular abscess. No muscular atrophy.  -No fractures are evident.                                                                      IMPRESSION:  1.  Findings worrisome for septic arthritis knee joint.  2.  Soft tissue emphysema and stranding lateral to the joint consistent with recent aspiration procedure.  3.  Remainder unremarkable.      EXAM: CT WRIST LEFT W CONTRAST  LOCATION: [facility]  DATE: 8/3/2023     INDICATION: wrist swelling  COMPARISON: None.  TECHNIQUE: IV contrast. Axial, sagittal and coronal thin-section reconstruction. Dose reduction techniques were used.   CONTRAST: isovue 370 90ml     FINDINGS:      BONES:  -There is no evidence for an acute fracture. No cortical destructive change to suggest osteomyelitis of the wrist. Joint spaces are maintained.     SOFT TISSUES:  -There is nonspecific soft tissue prominence and edema along the dorsal aspect of the wrist where there is a tiny focus of subcutaneous gas. Joint effusion is not assessed by CT. There is hypoattenuation within the muscles of the distal forearm which is   nonspecific.                                                                      IMPRESSION:  1.  Nonspecific soft tissue prominence and edema along the dorsal aspect of the wrist where there is a tiny  focus of soft tissue gas. Findings do raise concern for infection. Tiny foci of soft tissue gas can be seen from a direct extension from prior   injury although a gas-forming organism is not excluded. MRI is more sensitive for soft tissue infection and septic arthritis.  2.  Nonspecific hypoattenuation in the muscles of the distal forearm is nonspecific. Recommend MRI of the wrist/forearm for further evaluation if there is concern for soft tissue infection.  3.  No evidence of osteomyelitis or acute fracture.    PERTINENT LABS:  Personally reviewed  Recent Labs   Lab Test 08/04/23  0543   HGB 11.2*                Component Ref Range & Units 1 d ago    % Neutrophils % 88    % Lymphocytes % 3    % Monocyte/Macrophages % 10        Gram Stain Result No organisms seen      4+ WBC seen   Predominantly PMNs           Crystals Analysis No clinically significant crystals seen. No clinically significant crystals seen.     SOLOMON PETERS PA-C  Date: 8/4/2023  Time: 1:12 PM  [facility]    CC1:   Pete Moreno MD

## 2023-08-04 NOTE — PLAN OF CARE
Problem: Plan of Care - These are the overarching goals to be used throughout the patient stay.    Goal: Plan of Care Review  Description: The Plan of Care Review/Shift note should be completed every shift.  The Outcome Evaluation is a brief statement about your assessment that the patient is improving, declining, or no change.  This information will be displayed automatically on your shift note.  8/4/2023 0548 by Rafaela Goetz RN  Outcome: Progressing  8/4/2023 0308 by Rafaela Goetz RN  Outcome: Progressing   Goal Outcome Evaluation:      Alert and oriented. A1 to BSC, stand and pivot. Unable to ambulate due to pain. UA sent. Pain controlled w/ IV toradol. L wrist on R knee very painful, edematous. Possible I & D today, NPO since 0000.

## 2023-08-04 NOTE — PLAN OF CARE
Problem: Plan of Care - These are the overarching goals to be used throughout the patient stay.    Goal: Plan of Care Review  Description: The Plan of Care Review/Shift note should be completed every shift.  The Outcome Evaluation is a brief statement about your assessment that the patient is improving, declining, or no change.  This information will be displayed automatically on your shift note.  Outcome: Progressing  Flowsheets (Taken 8/4/2023 1128)  Plan of Care Reviewed With: patient   Goal Outcome Evaluation:      Plan of Care Reviewed With: patient      Pt is alert and oriented x 4, ambulating independently. Joint pain controled with PRN Toradol and schedule Tylenol. Vitas are stable on room air. MRI Left wrist has been order and to be completed tonight. Ortho following. NPO at midnight for possible IND. Continue on IV Vancomycin and Meropenem Q 8 hrs.
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
## 2023-08-05 ENCOUNTER — TELEPHONE (OUTPATIENT)
Dept: OTHER | Facility: CLINIC | Age: 38
End: 2023-08-05
Payer: COMMERCIAL

## 2023-08-05 ENCOUNTER — APPOINTMENT (OUTPATIENT)
Dept: MRI IMAGING | Facility: HOSPITAL | Age: 38
End: 2023-08-05
Payer: COMMERCIAL

## 2023-08-05 VITALS
DIASTOLIC BLOOD PRESSURE: 63 MMHG | SYSTOLIC BLOOD PRESSURE: 121 MMHG | HEIGHT: 65 IN | RESPIRATION RATE: 18 BRPM | BODY MASS INDEX: 19.58 KG/M2 | OXYGEN SATURATION: 93 % | WEIGHT: 117.5 LBS | TEMPERATURE: 98.4 F | HEART RATE: 78 BPM

## 2023-08-05 LAB
ANION GAP SERPL CALCULATED.3IONS-SCNC: 8 MMOL/L (ref 7–15)
BACTERIA UR CULT: NO GROWTH
BUN SERPL-MCNC: 8.4 MG/DL (ref 6–20)
CALCIUM SERPL-MCNC: 8.8 MG/DL (ref 8.6–10)
CHLORIDE SERPL-SCNC: 105 MMOL/L (ref 98–107)
CREAT SERPL-MCNC: 0.7 MG/DL (ref 0.51–0.95)
DEPRECATED HCO3 PLAS-SCNC: 27 MMOL/L (ref 22–29)
GFR SERPL CREATININE-BSD FRML MDRD: >90 ML/MIN/1.73M2
GLUCOSE SERPL-MCNC: 107 MG/DL (ref 70–99)
POTASSIUM SERPL-SCNC: 4.6 MMOL/L (ref 3.4–5.3)
SODIUM SERPL-SCNC: 140 MMOL/L (ref 136–145)
VANCOMYCIN SERPL-MCNC: 7.9 UG/ML

## 2023-08-05 PROCEDURE — 99238 HOSP IP/OBS DSCHRG MGMT 30/<: CPT | Mod: GC

## 2023-08-05 PROCEDURE — 250N000013 HC RX MED GY IP 250 OP 250 PS 637

## 2023-08-05 PROCEDURE — 82310 ASSAY OF CALCIUM: CPT

## 2023-08-05 PROCEDURE — 36415 COLL VENOUS BLD VENIPUNCTURE: CPT | Performed by: FAMILY MEDICINE

## 2023-08-05 PROCEDURE — 250N000011 HC RX IP 250 OP 636: Mod: JZ

## 2023-08-05 PROCEDURE — 80202 ASSAY OF VANCOMYCIN: CPT | Performed by: FAMILY MEDICINE

## 2023-08-05 PROCEDURE — 250N000009 HC RX 250

## 2023-08-05 PROCEDURE — 73221 MRI JOINT UPR EXTREM W/O DYE: CPT | Mod: LT

## 2023-08-05 RX ORDER — CEFAZOLIN SODIUM 1 G/50ML
1250 SOLUTION INTRAVENOUS EVERY 12 HOURS
Status: DISCONTINUED | OUTPATIENT
Start: 2023-08-05 | End: 2023-08-05 | Stop reason: HOSPADM

## 2023-08-05 RX ADMIN — VANCOMYCIN HYDROCHLORIDE 1000 MG: 1 INJECTION, SOLUTION INTRAVENOUS at 10:20

## 2023-08-05 RX ADMIN — MEROPENEM 1 G: 1 INJECTION, POWDER, FOR SOLUTION INTRAVENOUS at 05:46

## 2023-08-05 RX ADMIN — LIDOCAINE: 40 CREAM TOPICAL at 13:34

## 2023-08-05 RX ADMIN — KETOROLAC TROMETHAMINE 30 MG: 30 INJECTION INTRAMUSCULAR; INTRAVENOUS at 04:13

## 2023-08-05 RX ADMIN — MEROPENEM 1 G: 1 INJECTION, POWDER, FOR SOLUTION INTRAVENOUS at 13:01

## 2023-08-05 RX ADMIN — LORAZEPAM 0.5 MG: 0.5 TABLET ORAL at 08:48

## 2023-08-05 RX ADMIN — KETOROLAC TROMETHAMINE 30 MG: 30 INJECTION INTRAMUSCULAR; INTRAVENOUS at 10:19

## 2023-08-05 ASSESSMENT — ACTIVITIES OF DAILY LIVING (ADL)
ADLS_ACUITY_SCORE: 27
ADLS_ACUITY_SCORE: 21
ADLS_ACUITY_SCORE: 27
ADLS_ACUITY_SCORE: 21
ADLS_ACUITY_SCORE: 21
ADLS_ACUITY_SCORE: 27
ADLS_ACUITY_SCORE: 27

## 2023-08-05 NOTE — PROGRESS NOTES
[facility]  Supervising Physician Dr. Pfeiffer    S: Pt is a 37 y/o with Hx of depression and IVDA admitted for L wrist and R knee pain and effusions, on antibiotic therapy.  Pt somnolent after returning from L wrist MRI.  Interviewed her with her adolescent daughter while younger daughter slept in pt's bed beside her.  States feeling better overall, especially in L wrist since yesterday, noticing decrease in swelling and better use of fingers.    O: Somnolent but arousable.  L wrist warm, with mild erythema and very mild edema dorsally at wrist.  WBC yesterday 11.6, down from 13.2 on 8/3/23, none today.  Remains afebrile.    MRI L wrist today shows nonspecific subcutaneous soft tissue edema dorsally.  No effusion or abscess.    Impression: L wrist cellulitis.  R knee pending Cx report in AM tomorrow to determine I&D per Dr. Lofton.  NPO after MN, but may begin PO until then.  Discussed case with Dr. Pfeiffer, who advises that this pt does not seem to be a good candidate for surgery for her wrist.  Follow up with her in clinic on discharge.

## 2023-08-05 NOTE — PLAN OF CARE
Problem: Plan of Care - These are the overarching goals to be used throughout the patient stay.    Goal: Absence of Hospital-Acquired Illness or Injury  Outcome: Progressing  Intervention: Identify and Manage Fall Risk  Recent Flowsheet Documentation  Taken 8/5/2023 0512 by Daniel Forbes RN  Safety Promotion/Fall Prevention: activity supervised  Taken 8/5/2023 0135 by Daniel Forbes RN  Safety Promotion/Fall Prevention: activity supervised  Taken 8/4/2023 2200 by Daniel Forbes RN  Safety Promotion/Fall Prevention: activity supervised   Goal Outcome Evaluation:             Patient was agitated this shift and threatened to leave the hospital if her family member was not allowed to spend the night. Nursing supervisor notified. She's Otherwise afebrile this shift. VSS. Ongoing pain management with PRN toradol, reports relief.

## 2023-08-05 NOTE — PROGRESS NOTES
"Orthopedic Surgery Progress Note    Subjective: No acute events overnight. Pain well controlled on current regimen. NPO since midnight. Voiding spontaneously. Antegrade bowel function. Denies cp, sob, calf pain, fevers, chills, sweats.  Reporting improvement in function, motion and decreased pain in both the left wrist and right knee.  Has noticed decrease swelling the right knee as well.    Exam:  /63 (BP Location: Left leg)   Pulse 78   Temp 98.4  F (36.9  C) (Oral)   Resp 18   Ht 1.651 m (5' 5\")   Wt 53.3 kg (117 lb 8 oz)   SpO2 93%   BMI 19.55 kg/m    Gen: Awake, alert, NAD  Resp: breathing equal and non-labored  Extremities:    RLE: Moderate effusion.  Tolerates full extension to at least 90 degrees of flexion without pain.  Tolerates axial compression at the foot in full extension. 5/5 EHL/FHL/TA/Gsc.  [place] in s/s/dp/sp/t distributions.  2+ DP and PT pulses. Toes WWP, adequate cap refill.    Labs:    Recent Labs   Lab 08/04/23  0543 08/03/23  1416   WBC 11.6* 13.2*   HGB 11.2* 11.7    292     Recent Labs   Lab 08/05/23  0905 08/04/23  0738 08/04/23  0543 08/03/23  1416     --  138 139   POTASSIUM 4.6  --  4.0 4.0   CHLORIDE 105  --  104 104   CO2 27  --  23 24   BUN 8.4  --  9.4 9.9   CR 0.70  --  0.68  0.67 0.69   * 98 93 92     No lab results found in last 7 days.    Right knee aspiration (8/3/2023): No growth to date, negative for crystals, 100,000 WBCs, 88% PMNs    Assessment:   38 year old female with PMH of anxiety, depression, drug use (IVDA as recently as June) with right knee pain (since 8/1) and left wrist pain (since 8/2)    Aspiration right knee without evidence for septic arthritis.  Clinically improving over the last 24 hours with decreased effusion, improved range of motion tolerance, decreased pain.    Plan:  Hospitalist primary  -Continue antibiotics per primary service  -Okay with diet as long as orthopedic hand service has no plans for surgical management " today  -N.p.o. midnight as we continue to monitor cultures  -Pain control per primary  -Continue to follow right knee cultures    Disposition: Pending right knee aspiration cultures, n.p.o. midnight        Kilo Lofton MD  Orthopedic Surgery  [facility]

## 2023-08-05 NOTE — SIGNIFICANT EVENT
Significant Event Note  1:30 pm 8/5/23     Description of event:  Called to bedside by nurse. Upon arrival, patient and her children were aggressively angry. Patient is not pleased with her care thus far stating that no one has told her the purpose of her antibiotic treatment, imaging, etc. She states she would like to be transferred to a different hospital and repeatedly states it is within her patient rights. Explained to the patient that we cannot facilitate a transfer to another hospital unless it is medically indicated, patient requires a higher level of care, etc. Stated that if the patient wanted to be treated at another facility based purely on preference, and in which case interrupt her current IV antibiotic regimen for concern of septic joint in the setting of IV drug use, she would need to leave against medical advise. Patient states she understands the risks of leaving including but not limited to: worsening infection, sepsis, death. AMA paperwork signed. Discharge paperwork provided outlining risks discussed.    Jennifer Chacko MD PGY-2  [facility]

## 2023-08-05 NOTE — PHARMACY-VANCOMYCIN DOSING SERVICE
Pharmacy Vancomycin Note  Date of Service 2023  Patient's  1985   38 year old, female    Indication: Bone and Joint Infection  Day of Therapy: 3  Current vancomycin regimen:  1000 mg IV q12h  Current vancomycin monitoring method: AUC  Current vancomycin therapeutic monitoring goal: 400-600 mg*h/L    Insight[employer] Prediction of Current Vancomycin Regimen  Regimen: 1000 mg IV every 12 hours.  Start time: 22:20 on 2023  Exposure target: AUC24 (range)400-600 mg/L.hr   AUC24,ss: 406 mg/L.hr  Probability of AUC24 > 400: 53 %  Ctrough,ss: 9.8 mg/L  Probability of Ctrough,ss > 20: 0 %  Probability of nephrotoxicity (Lodise CAREN ): 6 %    Current estimated CrCl = Estimated Creatinine Clearance: 93.5 mL/min (based on SCr of 0.7 mg/dL).    Creatinine for last 3 days  8/3/2023:  2:16 PM Creatinine 0.69 mg/dL  2023:  5:43 AM Creatinine 0.67 mg/dL;  5:43 AM Creatinine 0.68 mg/dL  2023:  9:05 AM Creatinine 0.70 mg/dL    Recent Vancomycin Levels (past 3 days)  2023:  9:05 AM Vancomycin 7.9 ug/mL    Vancomycin IV Administrations (past 72 hours)                     vancomycin (VANCOCIN) 1000 mg in dextrose 5% 200 mL PREMIX (mg) 1,000 mg New Bag 23 1020     1,000 mg New Bag 23 2130     1,000 mg New Bag  1233     1,000 mg New Bag 23 2224    vancomycin (VANCOCIN) 1,250 mg in sodium chloride 0.9 % 250 mL intermittent infusion (mg) 1,250 mg New Bag 23 1455                    Nephrotoxins and other renal medications (From now, onward)      Start     Dose/Rate Route Frequency Ordered Stop    23  vancomycin (VANCOCIN) 1,250 mg in sodium chloride 0.9 % 250 mL intermittent infusion         1,250 mg  over 90 Minutes Intravenous EVERY 12 HOURS 23 1152      23 180  ketorolac (TORADOL) injection 30 mg         30 mg Intravenous EVERY 6 HOURS PRN 23 18023 180               Contrast Orders - past 72 hours (72h ago, onward)      Start     Dose/Rate Route  Frequency Stop    08/03/23 2130  iopamidol (ISOVUE-370) solution 90 mL         90 mL Intravenous ONCE 08/03/23 2119            Interpretation of levels and current regimen:  Vancomycin level is reflective of -600    Has serum creatinine changed greater than 50% in last 72 hours: No      Renal Function: Stable    InsightRX Prediction of Planned New Vancomycin Regimen  Regimen: 1250 mg IV every 12 hours.  Start time: 20:00 on 08/05/2023  Exposure target: AUC24 (range)400-600 mg/L.hr   AUC24,ss: 507 mg/L.hr  Probability of AUC24 > 400: 90 %  Ctrough,ss: 12.4 mg/L  Probability of Ctrough,ss > 20: 3 %  Probability of nephrotoxicity (Lodise CAREN 2009): 8 %    Plan:  Increase Dose to 1250 mg IV q12h  Vancomycin monitoring method: AUC  Vancomycin therapeutic monitoring goal: 400-600 mg*h/L  Pharmacy will check vancomycin levels as appropriate.  Serum creatinine levels will be ordered daily for the first week of therapy and at least twice weekly for subsequent weeks.    Monty Amezquita RPH

## 2023-08-05 NOTE — PLAN OF CARE
Problem: Plan of Care - These are the overarching goals to be used throughout the patient stay.    Goal: Plan of Care Review  Description: The Plan of Care Review/Shift note should be completed every shift.  The Outcome Evaluation is a brief statement about your assessment that the patient is improving, declining, or no change.  This information will be displayed automatically on your shift note.  Outcome: Progressing  Flowsheets (Taken 8/5/2023 1041)  Plan of Care Reviewed With: patient   Goal Outcome Evaluation:      Plan of Care Reviewed With: patient  Pt expressing that she would like to be discharge to another facility, upset about the care received in ED. Pt was given IV Dilaudid which she was allergic to it. MD called at bedside explained current plan of care. At 1330 MD called at bedside for the 2 nd time post peripheral iv infiltration. Pt demanded to be transferred to another facility, MD explained once again but pt insisting to be transfer. AMA paper was provided and signed by the patient and MD.

## 2023-08-05 NOTE — DISCHARGE SUMMARY
[facility]  Discharge Summary - Medicine & Pediatrics       Date of Admission:  8/3/2023  Date of Discharge:  8/5/2023  1:57 PM  Discharging Provider: Jennifer Chacko MD   Discharge Service: Hospitalist Service    Discharge Diagnoses   - Polyarthralgia   - History of IV drug use   - Concern for septic joint(s)    Clinically Significant Risk Factors      Patient elected to leave AMA prior to conclusion of work up. Discussed with patient that septic joint of her right knee has not been entirely ruled out, awaiting results of aspirate cultures. Standard of care would be to empirically continue IV antibiotic course. She states she would like to be transferred to a different hospital and repeatedly states it is within her patient rights. Explained to the patient that we cannot facilitate a transfer to another hospital unless it is medically indicated, patient requires a higher level of care, etc. Stated that if the patient wanted to be treated at another facility based purely on preference, and in which case interrupt her current IV antibiotic regimen for concern of septic joint while awaiting aspirate and blood culture results, she would need to leave against medical advice. Patient states she understands the risks of leaving including but not limited to: inconclusive work up, worsening infection, sepsis, death. AMA paperwork signed. Discharge paperwork provided outlining risks discussed.     Follow-ups Needed After Discharge   Follow-up Appointments     Follow-up and recommended labs and tests       - Suggest patient remain on our service to continue IV antibiotics for   concern for septic joint and possible surgical intervention pending joint   aspirate.   - Mother and children in the room at time of discharge.   - State we cannot transfer patient to another facility per patient   preference. Discussed risks of discontinuing current management and   patient leaves AMA. Seems to be competent  decision maker.            Unresulted Labs Ordered in the Past 30 Days of this Admission       Date and Time Order Name Status Description    8/3/2023  2:18 PM ANAEROBIC BACTERIAL CULTURE ROUTINE Preliminary     8/3/2023  2:18 PM AEROBIC BACTERIAL CULTURE ROUTINE Preliminary     8/3/2023  2:08 PM Blood Culture Peripheral Blood Preliminary     8/3/2023  2:08 PM Blood Culture Peripheral Blood Preliminary     8/3/2023  2:01 PM Blood Culture Peripheral Blood Preliminary         Result follow up not scheduled as patient elected to leave AMA.     Discharge Disposition   Left AMA.   Unclear living situation.   Left with her mother and multiple children.   Condition at discharge: indeterminate, patient departed prior to conclusion of work up     Hospital Course   Elizabet Stapleton is a 38 year old female with PMH of IV heroin and methamphetamine use. Presenting with polyarthalgia highly concerning for septic joints given social history. Patient elected to leave AMA prior to conclusion of work up. Received 2 days IV vanc + meropenem without conclusive source control.      Polyarthralgia - Right Knee, Left Wrist  Concern for Septic Joint    Patient presented to urgent care on 8/3/23 with 6 day history of left wrist and right knee edema and erythema. Joint aspiration of right knee completed and concerning for infectious etiology. Encouraged to present to our emergency department. Work up during admission at our facility included a CT of the left wrist with soft tissue swelling and soft tissue gas + CT of the right knee concerning for septic arthritis. Empiric IV vanc and meropenem initiated for staph coverage given patients penicillin allergy and need to cover staph in the setting of known IV drug use. Repeat MRI of left wrist on day of leaving AMA revealed concern for cellulitis and possible tendonitis. Blood cultures and aspirate cultures showed no growth to date at that time. At time of patient's departure from hospital, most  likely differently felt to be septic joint 2/2 IV drug. Orthopedic surgery team involved throughout admission and intended for joint washout if joint cultures revealed growth. JERO not completed at time of patient's departure as patient had no growth to date on blood cultures and cardiac exam was unremarkable.    - Patient signed out AMA    - s/p 2 days IV vanc + meropenem      History of IV Drug Use   Admits to history of IVDU. Last reported use of injection drugs in June 2023. UDS on admission positive for amphetamines and THC.      Homelessness  Safety concerns   from father of her children. Reportedly has custody of her children but father of children still present at times during admission. Patient reports a history of homelessness and currently living with her mother who has a cancer diagnosis.   - SW/CM consulted     Adverse Reaction to Dilaudid   Patient given a dose of dilaudid upon admission to ED and subsequently became less responsive. Responded to Narcan. Subsequently used Toradol for pain management during hospital stay.    - Dilaudid added to allergy list    - Avoid dilaudid, caution with opioids for pain management     Consultations This Hospital Stay   PHARMACY TO DOSE VANCO  CARE MANAGEMENT / SOCIAL WORK IP CONSULT  PHARMACY TO DOSE VANCO  CARE MANAGEMENT / SOCIAL WORK IP CONSULT    Code Status   Prior     Attending physician Dr. Moreno also present during patient discussion.     Jennifer Chacko MD  [place] [id number]  Phone: [phone]  Fax: [phone]  ______________________________________________________________________    Physical Exam   Vital Signs: Temp: 98.4  F (36.9  C) Temp src: Oral BP: 121/63 Pulse: 78   Resp: 18 SpO2: 93 % O2 Device: None (Room air)    Weight: 117 lbs 8 oz  General: Agitated and angry. Not easily redirected. Mother and multiple children present.   Extremities: Right arm with  infiltrate at prior IV site. Left wrist with overlying erythema. Right knee with overlying erythema and diffuse edema.   Psych:     Primary Care Physician   Rosette Barone MD    Discharge Orders      Reason for your hospital stay    Patient admitted for concern of septic joint in the right knee and left wrist in the setting of IV drug use. Imaging of right knee suggestive of septic joint, imaging of left wrist suggestive of cellulitis. Recommended patient continue on IV antibiotic course given insufficient intervention of right knee concerning for septic joint. Patient ultimately electing to leave against medical advice. We have discussed that this is not advised given risk of ongoing infection causing sepsis or death. Patient states she understands these risk. States she plans to seek medical care elsewhere. Discussed with patient that we cannot transfer patient to a different facility per patient preference.     Follow-up and recommended labs and tests     - Suggest patient remain on our service to continue IV antibiotics for concern for septic joint and possible surgical intervention pending joint aspirate.   - Mother and children in the room at time of discharge.   - State we cannot transfer patient to another facility per patient preference. Discussed risks of discontinuing current management and patient leaves AMA. Seems to be competent decision maker.     Significant Results and Procedures   Most Recent 3 CBC's:  Recent Labs   Lab Test 08/04/23  0543 08/03/23  1416 03/28/21  1919   WBC 11.6* 13.2* 6.2   HGB 11.2* 11.7 12.8   MCV 96 98 97    292 210     Most Recent 3 BMP's:  Recent Labs   Lab Test 08/05/23  0905 08/04/23  0738 08/04/23  0543 08/03/23  1416     --  138 139   POTASSIUM 4.6  --  4.0 4.0   CHLORIDE 105  --  104 104   CO2 27  --  23 24   BUN 8.4  --  9.4 9.9   CR 0.70  --  0.68  0.67 0.69   ANIONGAP 8  --  11 11   MOISÉS 8.8  --  8.8 9.5   * 98 93 92       Discharge  Medications   Discharge Medication List as of 8/5/2023  1:55 PM        CONTINUE these medications which have NOT CHANGED    Details   ibuprofen (ADVIL/MOTRIN) 600 MG tablet Take 600 mg by mouth every 6 hours as needed, Historical           Allergies   Allergies   Allergen Reactions    Macrobid [Nitrofurantoin] Rash and Anaphylaxis    Hydromorphone Other (See Comments)     Hypersensitivity, respiratory depression with opiates     Penicillins

## 2023-08-05 NOTE — TELEPHONE ENCOUNTER
Orthopedic update:    Received report that patient left AMA late morning today after writer had evaluated patient and discussed plan. Please refer to rounding note for details.    Attempted to call patient with information regarding new lab results (as of 2:16 pm today): right knee aspirate from 8/3 now +Neisseria gonorrhoeae    Unable to reach patient at phone numbers listed in chart (did not answer and mailboxes full).    Recommend presentation to ER for ongoing antibiotic coverage and discussion of operative intervention for septic knee arthritis.    Kilo Lofton MD  [facility]

## 2023-08-06 NOTE — PROGRESS NOTES
Noted joint fluid with N Gonorrhea.  Attempted to call pt and goes directly to [facility].  Called daughter and spoke with her.  She stated that pt did go to a hospital last night and is currently hospitalized.  She is unsure which hospital at this time but is planning on visiting her today and will discuss results with pt and medical staff.

## 2023-08-08 LAB
BACTERIA BLD CULT: NO GROWTH

## 2023-08-10 LAB — BACTERIA SNV CULT: ABNORMAL

## 2023-08-17 LAB — BACTERIA SNV CULT: ABNORMAL

## 2024-06-23 ENCOUNTER — HEALTH MAINTENANCE LETTER (OUTPATIENT)
Age: 39
End: 2024-06-23

## 2024-09-19 ENCOUNTER — OFFICE VISIT (OUTPATIENT)
Dept: URGENT CARE | Facility: URGENT CARE | Age: 39
End: 2024-09-19
Payer: COMMERCIAL

## 2024-09-19 ENCOUNTER — NURSE TRIAGE (OUTPATIENT)
Dept: INTERNAL MEDICINE | Facility: CLINIC | Age: 39
End: 2024-09-19
Payer: COMMERCIAL

## 2024-09-19 VITALS
HEART RATE: 100 BPM | DIASTOLIC BLOOD PRESSURE: 69 MMHG | TEMPERATURE: 98.4 F | OXYGEN SATURATION: 99 % | RESPIRATION RATE: 16 BRPM | SYSTOLIC BLOOD PRESSURE: 106 MMHG

## 2024-09-19 DIAGNOSIS — F19.90 IV DRUG USER: Primary | ICD-10-CM

## 2024-09-19 DIAGNOSIS — M79.89 SWELLING OF RIGHT UPPER EXTREMITY: ICD-10-CM

## 2024-09-19 DIAGNOSIS — N89.9 SWELLING OF VAGINA: ICD-10-CM

## 2024-09-19 DIAGNOSIS — B96.89 BV (BACTERIAL VAGINOSIS): ICD-10-CM

## 2024-09-19 DIAGNOSIS — R30.0 DYSURIA: ICD-10-CM

## 2024-09-19 DIAGNOSIS — N76.0 BV (BACTERIAL VAGINOSIS): ICD-10-CM

## 2024-09-19 LAB
ALBUMIN UR-MCNC: ABNORMAL MG/DL
APPEARANCE UR: CLEAR
BACTERIA #/AREA URNS HPF: ABNORMAL /HPF
BASOPHILS # BLD AUTO: 0 10E3/UL (ref 0–0.2)
BASOPHILS NFR BLD AUTO: 1 %
BILIRUB UR QL STRIP: ABNORMAL
CLUE CELLS: PRESENT
COLOR UR AUTO: YELLOW
EOSINOPHIL # BLD AUTO: 0.2 10E3/UL (ref 0–0.7)
EOSINOPHIL NFR BLD AUTO: 2 %
ERYTHROCYTE [DISTWIDTH] IN BLOOD BY AUTOMATED COUNT: 12.5 % (ref 10–15)
GLUCOSE UR STRIP-MCNC: NEGATIVE MG/DL
HCT VFR BLD AUTO: 40.5 % (ref 35–47)
HGB BLD-MCNC: 13.2 G/DL (ref 11.7–15.7)
HGB UR QL STRIP: ABNORMAL
IMM GRANULOCYTES # BLD: 0 10E3/UL
IMM GRANULOCYTES NFR BLD: 0 %
KETONES UR STRIP-MCNC: ABNORMAL MG/DL
LEUKOCYTE ESTERASE UR QL STRIP: NEGATIVE
LYMPHOCYTES # BLD AUTO: 1.3 10E3/UL (ref 0.8–5.3)
LYMPHOCYTES NFR BLD AUTO: 17 %
MCH RBC QN AUTO: 31.7 PG (ref 26.5–33)
MCHC RBC AUTO-ENTMCNC: 32.6 G/DL (ref 31.5–36.5)
MCV RBC AUTO: 97 FL (ref 78–100)
MONOCYTES # BLD AUTO: 0.7 10E3/UL (ref 0–1.3)
MONOCYTES NFR BLD AUTO: 9 %
MUCOUS THREADS #/AREA URNS LPF: PRESENT /LPF
NEUTROPHILS # BLD AUTO: 5.3 10E3/UL (ref 1.6–8.3)
NEUTROPHILS NFR BLD AUTO: 71 %
NITRATE UR QL: NEGATIVE
PH UR STRIP: 5.5 [PH] (ref 5–7)
PLATELET # BLD AUTO: 286 10E3/UL (ref 150–450)
RBC # BLD AUTO: 4.17 10E6/UL (ref 3.8–5.2)
RBC #/AREA URNS AUTO: ABNORMAL /HPF
SP GR UR STRIP: >=1.03 (ref 1–1.03)
SQUAMOUS #/AREA URNS AUTO: ABNORMAL /LPF
T PALLIDUM AB SER QL: NONREACTIVE
TRICHOMONAS, WET PREP: ABNORMAL
UROBILINOGEN UR STRIP-ACNC: 1 E.U./DL
WBC # BLD AUTO: 7.5 10E3/UL (ref 4–11)
WBC #/AREA URNS AUTO: ABNORMAL /HPF
WBC'S/HIGH POWER FIELD, WET PREP: ABNORMAL
YEAST, WET PREP: ABNORMAL

## 2024-09-19 PROCEDURE — 96372 THER/PROPH/DIAG INJ SC/IM: CPT | Performed by: PHYSICIAN ASSISTANT

## 2024-09-19 PROCEDURE — 87389 HIV-1 AG W/HIV-1&-2 AB AG IA: CPT | Performed by: PHYSICIAN ASSISTANT

## 2024-09-19 PROCEDURE — 87591 N.GONORRHOEAE DNA AMP PROB: CPT | Performed by: PHYSICIAN ASSISTANT

## 2024-09-19 PROCEDURE — 86780 TREPONEMA PALLIDUM: CPT | Performed by: PHYSICIAN ASSISTANT

## 2024-09-19 PROCEDURE — 80048 BASIC METABOLIC PNL TOTAL CA: CPT | Performed by: PHYSICIAN ASSISTANT

## 2024-09-19 PROCEDURE — 87491 CHLMYD TRACH DNA AMP PROBE: CPT | Performed by: PHYSICIAN ASSISTANT

## 2024-09-19 PROCEDURE — 36415 COLL VENOUS BLD VENIPUNCTURE: CPT | Performed by: PHYSICIAN ASSISTANT

## 2024-09-19 PROCEDURE — 99204 OFFICE O/P NEW MOD 45 MIN: CPT | Mod: 25 | Performed by: PHYSICIAN ASSISTANT

## 2024-09-19 PROCEDURE — 87210 SMEAR WET MOUNT SALINE/INK: CPT | Performed by: PHYSICIAN ASSISTANT

## 2024-09-19 PROCEDURE — 85025 COMPLETE CBC W/AUTO DIFF WBC: CPT | Performed by: PHYSICIAN ASSISTANT

## 2024-09-19 PROCEDURE — 81001 URINALYSIS AUTO W/SCOPE: CPT | Performed by: PHYSICIAN ASSISTANT

## 2024-09-19 RX ORDER — DOXYCYCLINE 100 MG/1
100 CAPSULE ORAL 2 TIMES DAILY
Qty: 14 CAPSULE | Refills: 0 | Status: SHIPPED | OUTPATIENT
Start: 2024-09-19 | End: 2024-09-26

## 2024-09-19 RX ORDER — CEFTRIAXONE SODIUM 1 G
500 VIAL (EA) INJECTION ONCE
Status: COMPLETED | OUTPATIENT
Start: 2024-09-19 | End: 2024-09-19

## 2024-09-19 RX ORDER — METRONIDAZOLE 500 MG/1
500 TABLET ORAL 2 TIMES DAILY
Qty: 14 TABLET | Refills: 0 | Status: SHIPPED | OUTPATIENT
Start: 2024-09-19 | End: 2024-09-26

## 2024-09-19 RX ADMIN — Medication 500 MG: at 16:03

## 2024-09-19 NOTE — PROGRESS NOTES
IV drug user  - CBC with platelets and differential  - Wet prep - Clinic Collect  - Basic metabolic panel  (Ca, Cl, CO2, Creat, Gluc, K, Na, BUN)  - Chlamydia trachomatis/Neisseria gonorrhoeae by PCR - Clinic Collect  - HIV Antigen Antibody Combo Cascade  - Treponema Abs w Reflex to RPR and Titer  - cefTRIAXone (ROCEPHIN) in lidocaine 1% (PF) for IM administration only 500 mg  - doxycycline hyclate (VIBRAMYCIN) 100 MG capsule; Take 1 capsule (100 mg) by mouth 2 times daily for 7 days.    Swelling of right upper extremity  - CBC with platelets and differential  - Basic metabolic panel  (Ca, Cl, CO2, Creat, Gluc, K, Na, BUN)    Swelling of vagina  - UA Macroscopic with reflex to Microscopic and Culture - Clinic Collect  - Wet prep - Clinic Collect  - Chlamydia trachomatis/Neisseria gonorrhoeae by PCR - Clinic Collect  - HIV Antigen Antibody Combo Cascade  - Treponema Abs w Reflex to RPR and Titer  - UA Microscopic with Reflex to Culture  - cefTRIAXone (ROCEPHIN) in lidocaine 1% (PF) for IM administration only 500 mg  - doxycycline hyclate (VIBRAMYCIN) 100 MG capsule; Take 1 capsule (100 mg) by mouth 2 times daily for 7 days.    Dysuria  - UA Macroscopic with reflex to Microscopic and Culture - Clinic Collect  - Wet prep - Clinic Collect  - Chlamydia trachomatis/Neisseria gonorrhoeae by PCR - Clinic Collect  - UA Microscopic with Reflex to Culture    BV (bacterial vaginosis)  - metroNIDAZOLE (FLAGYL) 500 MG tablet; Take 1 tablet (500 mg) by mouth 2 times daily for 7 days.    Patient treated empirically for Gonorrhea and chlamydia. Also treated for BV.     Patient was advised to return to clinic for reevaluation (either UC or PCP) if symptoms do not improve in 7 days. Patient educated on red flag symptoms and asked to go directly to the ED if these symptoms present themselves.       30 minutes spent by me on the date of the encounter doing chart review, history and exam, documentation and further activities per the  note    [facility]    Subjective   39 year old who presents to clinic today for the following health issues:    Urgent Care, Derm Problem, Shortness of Breath, and Vaginal Bleeding       HPI     Vaginal Symptoms  Onset/Duration: Patient presents with abnormal vaginal bleeding and irritation- Difficulty urinating- Concern of infection on her right forearm. Patient reports she is an active iv drug user. Onset is uncertain.   Description:  Vaginal Discharge: none   Itching (Pruritis): No  Burning sensation:  No  Odor: No  Accompanying Signs & Symptoms:  Urinary symptoms: YES- Patient feels the need to urinary but she states that she can't. She also states that there is a burning sensation   Abdominal pain: YES but patient has been having some constipation.   Fever: No  History:   Sexually active: YES  New Partner: No  Possibility of Pregnancy: Patient is on oral birth control.   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 Patient also has a history of kidney infection 2 years ago.     Patient also has an area on her right forearm that hasn't healed and formed a small ulcer with mild surrounding redness. Patient states that this has been tender to touch and she denies any purulent discharge or bleeding. She has concern for infection.     Review of Systems   Review of Systems   See HPI    Objective    Temp: 98.4  F (36.9  C) Temp src: Temporal BP: 106/69 Pulse: 100   Resp: 16 SpO2: 99 %       Physical Exam   Physical Exam  Constitutional:       General: She is not in acute distress.     Appearance: Normal appearance. She is normal weight. She is not ill-appearing, toxic-appearing or diaphoretic.   HENT:      Head: Normocephalic and atraumatic.   Cardiovascular:      Rate and Rhythm: Normal rate.      Pulses: Normal  pulses.   Pulmonary:      Effort: Pulmonary effort is normal. No respiratory distress.   Abdominal:      Tenderness: There is no right CVA tenderness or left CVA tenderness.   Skin:            Comments: Patient has a small wound in the area shown above that seems to have formed an ulcer. There is a small amount of erythema and warmth around this area. It measures about 2 cm in diameter.    Neurological:      General: No focal deficit present.      Mental Status: She is alert and oriented to person, place, and time. Mental status is at baseline.      Gait: Gait normal.   Psychiatric:         Mood and Affect: Mood normal.         Behavior: Behavior normal.         Thought Content: Thought content normal.         Judgment: Judgment normal.          Results for orders placed or performed in visit on 09/19/24 (from the past 24 hour(s))   UA Macroscopic with reflex to Microscopic and Culture - Clinic Collect    Specimen: Urine, Midstream   Result Value Ref Range    Color Urine Yellow Colorless, Straw, Light Yellow, Yellow    Appearance Urine Clear Clear    Glucose Urine Negative Negative mg/dL    Bilirubin Urine Small (A) Negative    Ketones Urine Trace (A) Negative mg/dL    Specific Gravity Urine >=1.030 1.003 - 1.035    Blood Urine Moderate (A) Negative    pH Urine 5.5 5.0 - 7.0    Protein Albumin Urine Trace (A) Negative mg/dL    Urobilinogen Urine 1.0 0.2, 1.0 E.U./dL    Nitrite Urine Negative Negative    Leukocyte Esterase Urine Negative Negative   Wet prep - Clinic Collect    Specimen: Vagina; Swab   Result Value Ref Range    Trichomonas Absent Absent    Yeast Absent Absent    Clue Cells Present (A) Absent    WBCs/high power field 2+ (A) None   UA Microscopic with Reflex to Culture   Result Value Ref Range    Bacteria Urine Many (A) None Seen /HPF    RBC Urine 5-10 (A) 0-2 /HPF /HPF    WBC Urine 5-10 (A) 0-5 /HPF /HPF    Squamous Epithelials Urine Many (A) None Seen /LPF    Mucus Urine Present (A) None Seen /LPF     Narrative    Urine Culture not indicated   CBC with platelets and differential    Narrative    The following orders were created for panel order CBC with platelets and differential.  Procedure                               Abnormality         Status                     ---------                               -----------         ------                     CBC with platelets and d...[[id number]]                      Final result                 Please view results for these tests on the individual orders.   CBC with platelets and differential   Result Value Ref Range    WBC Count 7.5 4.0 - 11.0 10e3/uL    RBC Count 4.17 3.80 - 5.20 10e6/uL    Hemoglobin 13.2 11.7 - 15.7 g/dL    Hematocrit 40.5 35.0 - 47.0 %    MCV 97 78 - 100 fL    MCH 31.7 26.5 - 33.0 pg    MCHC 32.6 31.5 - 36.5 g/dL    RDW 12.5 10.0 - 15.0 %    Platelet Count 286 150 - 450 10e3/uL    % Neutrophils 71 %    % Lymphocytes 17 %    % Monocytes 9 %    % Eosinophils 2 %    % Basophils 1 %    % Immature Granulocytes 0 %    Absolute Neutrophils 5.3 1.6 - 8.3 10e3/uL    Absolute Lymphocytes 1.3 0.8 - 5.3 10e3/uL    Absolute Monocytes 0.7 0.0 - 1.3 10e3/uL    Absolute Eosinophils 0.2 0.0 - 0.7 10e3/uL    Absolute Basophils 0.0 0.0 - 0.2 10e3/uL    Absolute Immature Granulocytes 0.0 <=0.4 10e3/uL
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
## 2024-09-19 NOTE — LETTER
September 19, 2024      Elizabet Stapleton  [address] [place] [address]        To Whom It May Concern:    Elizabet Stapleton  was seen on 9/19.  Please excuse her sister for her absence today as she needed to give her a ride to the clinic.         Sincerely,        Monty Mendoza PA-C    
none

## 2024-09-20 ENCOUNTER — TELEPHONE (OUTPATIENT)
Dept: URGENT CARE | Facility: URGENT CARE | Age: 39
End: 2024-09-20
Payer: COMMERCIAL

## 2024-09-20 LAB
ANION GAP SERPL CALCULATED.3IONS-SCNC: 13 MMOL/L (ref 7–15)
BUN SERPL-MCNC: 18.9 MG/DL (ref 6–20)
C TRACH DNA SPEC QL PROBE+SIG AMP: NEGATIVE
CALCIUM SERPL-MCNC: 8.9 MG/DL (ref 8.8–10.4)
CHLORIDE SERPL-SCNC: 107 MMOL/L (ref 98–107)
CREAT SERPL-MCNC: 1.05 MG/DL (ref 0.51–0.95)
EGFRCR SERPLBLD CKD-EPI 2021: 70 ML/MIN/1.73M2
GLUCOSE SERPL-MCNC: 81 MG/DL (ref 70–99)
HCO3 SERPL-SCNC: 23 MMOL/L (ref 22–29)
HIV 1+2 AB+HIV1 P24 AG SERPL QL IA: NONREACTIVE
N GONORRHOEA DNA SPEC QL NAA+PROBE: NEGATIVE
POTASSIUM SERPL-SCNC: 4.1 MMOL/L (ref 3.4–5.3)
SODIUM SERPL-SCNC: 143 MMOL/L (ref 135–145)

## 2024-09-20 NOTE — TELEPHONE ENCOUNTER
Negative STI testing. Would still have her finish course of doxy that was prescribed.    Vipul Nguyen MD

## 2024-09-22 NOTE — TELEPHONE ENCOUNTER
Pt was called and message was left requesting return call to discuss lab results.  Writer will send patient a letter with results.  Rochelle Nino MA

## 2025-04-06 ENCOUNTER — HOSPITAL ENCOUNTER (EMERGENCY)
Facility: CLINIC | Age: 40
Discharge: HOME | End: 2025-04-07
Attending: FAMILY MEDICINE | Admitting: FAMILY MEDICINE
Payer: COMMERCIAL

## 2025-04-06 ENCOUNTER — APPOINTMENT (OUTPATIENT)
Dept: GENERAL RADIOLOGY | Facility: CLINIC | Age: 40
End: 2025-04-06
Attending: FAMILY MEDICINE
Payer: COMMERCIAL

## 2025-04-06 DIAGNOSIS — Z32.01 PREGNANCY TEST POSITIVE: ICD-10-CM

## 2025-04-06 DIAGNOSIS — N39.0 UTI (URINARY TRACT INFECTION), BACTERIAL: ICD-10-CM

## 2025-04-06 DIAGNOSIS — Z3A.01 PREGNANCY WITH 7 COMPLETED WEEKS GESTATION: ICD-10-CM
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
## 2025-04-06 DIAGNOSIS — A49.9 UTI (URINARY TRACT INFECTION), BACTERIAL: ICD-10-CM

## 2025-04-06 PROBLEM — F15.20 METHAMPHETAMINE USE DISORDER, SEVERE, DEPENDENCE (H): Status: ACTIVE | Noted: 2023-08-07

## 2025-04-06 PROBLEM — Z69.81 PATIENT COUNSELED AS VICTIM OF DOMESTIC VIOLENCE: Status: ACTIVE | Noted: 2023-08-07

## 2025-04-06 PROBLEM — Z59.00 HOMELESS: Status: ACTIVE | Noted: 2023-08-05

## 2025-04-06 LAB
ALBUMIN UR-MCNC: 20 MG/DL
AMPHETAMINES UR QL SCN: ABNORMAL
APPEARANCE UR: ABNORMAL
BACTERIA #/AREA URNS HPF: ABNORMAL /HPF
BARBITURATES UR QL SCN: ABNORMAL
BENZODIAZ UR QL SCN: ABNORMAL
BILIRUB UR QL STRIP: NEGATIVE
BZE UR QL SCN: ABNORMAL
CANNABINOIDS UR QL SCN: ABNORMAL
COLOR UR AUTO: YELLOW
FENTANYL UR QL: ABNORMAL
GLUCOSE UR STRIP-MCNC: NEGATIVE MG/DL
HCG UR QL: POSITIVE
HGB UR QL STRIP: NEGATIVE
KETONES UR STRIP-MCNC: NEGATIVE MG/DL
LEUKOCYTE ESTERASE UR QL STRIP: ABNORMAL
MUCOUS THREADS #/AREA URNS LPF: PRESENT /LPF
NITRATE UR QL: POSITIVE
OPIATES UR QL SCN: ABNORMAL
PCP QUAL URINE (ROCHE): ABNORMAL
PH UR STRIP: 6.5 [PH] (ref 5–7)
RBC URINE: 1 /HPF
SP GR UR STRIP: 1.02 (ref 1–1.03)
SQUAMOUS EPITHELIAL: 3 /HPF
UROBILINOGEN UR STRIP-MCNC: NORMAL MG/DL
WBC URINE: 6 /HPF

## 2025-04-06 PROCEDURE — 87591 N.GONORRHOEAE DNA AMP PROB: CPT | Performed by: EMERGENCY MEDICINE

## 2025-04-06 PROCEDURE — 73090 X-RAY EXAM OF FOREARM: CPT | Mod: RT

## 2025-04-06 PROCEDURE — 87491 CHLMYD TRACH DNA AMP PROBE: CPT | Performed by: EMERGENCY MEDICINE

## 2025-04-06 PROCEDURE — 81025 URINE PREGNANCY TEST: CPT | Performed by: FAMILY MEDICINE

## 2025-04-06 PROCEDURE — 81001 URINALYSIS AUTO W/SCOPE: CPT | Performed by: FAMILY MEDICINE

## 2025-04-06 PROCEDURE — 99284 EMERGENCY DEPT VISIT MOD MDM: CPT | Mod: 25 | Performed by: FAMILY MEDICINE

## 2025-04-06 PROCEDURE — 80307 DRUG TEST PRSMV CHEM ANLYZR: CPT | Performed by: FAMILY MEDICINE

## 2025-04-06 PROCEDURE — 250N000013 HC RX MED GY IP 250 OP 250 PS 637: Performed by: FAMILY MEDICINE

## 2025-04-06 PROCEDURE — 87186 SC STD MICRODIL/AGAR DIL: CPT | Performed by: FAMILY MEDICINE

## 2025-04-06 PROCEDURE — 73130 X-RAY EXAM OF HAND: CPT | Mod: RT

## 2025-04-06 RX ORDER — ACETAMINOPHEN 500 MG
1000 TABLET ORAL ONCE
Status: COMPLETED | OUTPATIENT
Start: 2025-04-06 | End: 2025-04-06

## 2025-04-06 RX ORDER — IBUPROFEN 400 MG/1
400 TABLET, FILM COATED ORAL ONCE
Status: COMPLETED | OUTPATIENT
Start: 2025-04-06 | End: 2025-04-06

## 2025-04-06 RX ADMIN — IBUPROFEN 400 MG: 400 TABLET ORAL at 22:14

## 2025-04-06 RX ADMIN — ACETAMINOPHEN 1000 MG: 500 TABLET, FILM COATED ORAL at 22:14

## 2025-04-06 ASSESSMENT — COLUMBIA-SUICIDE SEVERITY RATING SCALE - C-SSRS
1. IN THE PAST MONTH, HAVE YOU WISHED YOU WERE DEAD OR WISHED YOU COULD GO TO SLEEP AND NOT WAKE UP?: NO
6. HAVE YOU EVER DONE ANYTHING, STARTED TO DO ANYTHING, OR PREPARED TO DO ANYTHING TO END YOUR LIFE?: NO
2. HAVE YOU ACTUALLY HAD ANY THOUGHTS OF KILLING YOURSELF IN THE PAST MONTH?: NO

## 2025-04-06 ASSESSMENT — ACTIVITIES OF DAILY LIVING (ADL)
ADLS_ACUITY_SCORE: 53
ADLS_ACUITY_SCORE: 53

## 2025-04-07 ENCOUNTER — APPOINTMENT (OUTPATIENT)
Dept: ULTRASOUND IMAGING | Facility: CLINIC | Age: 40
End: 2025-04-07
Attending: FAMILY MEDICINE
Payer: COMMERCIAL

## 2025-04-07 VITALS
TEMPERATURE: 98.5 F | DIASTOLIC BLOOD PRESSURE: 71 MMHG | WEIGHT: 120 LBS | SYSTOLIC BLOOD PRESSURE: 97 MMHG | OXYGEN SATURATION: 98 % | BODY MASS INDEX: 19.97 KG/M2 | HEART RATE: 78 BPM | RESPIRATION RATE: 20 BRPM

## 2025-04-07 LAB
ALBUMIN SERPL BCG-MCNC: 4.2 G/DL (ref 3.5–5.2)
ALP SERPL-CCNC: 63 U/L (ref 40–150)
ALT SERPL W P-5'-P-CCNC: 14 U/L (ref 0–50)
ANION GAP SERPL CALCULATED.3IONS-SCNC: 15 MMOL/L (ref 7–15)
AST SERPL W P-5'-P-CCNC: 18 U/L (ref 0–45)
BASOPHILS # BLD AUTO: 0 10E3/UL (ref 0–0.2)
BASOPHILS NFR BLD AUTO: 0 %
BILIRUB SERPL-MCNC: 0.3 MG/DL
BUN SERPL-MCNC: 11.7 MG/DL (ref 6–20)
C TRACH DNA SPEC QL NAA+PROBE: NEGATIVE
CALCIUM SERPL-MCNC: 8.6 MG/DL (ref 8.8–10.4)
CHLORIDE SERPL-SCNC: 100 MMOL/L (ref 98–107)
CREAT SERPL-MCNC: 0.83 MG/DL (ref 0.51–0.95)
EGFRCR SERPLBLD CKD-EPI 2021: >90 ML/MIN/1.73M2
EOSINOPHIL # BLD AUTO: 0.2 10E3/UL (ref 0–0.7)
EOSINOPHIL NFR BLD AUTO: 2 %
ERYTHROCYTE [DISTWIDTH] IN BLOOD BY AUTOMATED COUNT: 11.8 % (ref 10–15)
ETHANOL SERPL-MCNC: <0.01 G/DL
GLUCOSE SERPL-MCNC: 83 MG/DL (ref 70–99)
HCG INTACT+B SERPL-ACNC: ABNORMAL MIU/ML
HCO3 SERPL-SCNC: 20 MMOL/L (ref 22–29)
HCT VFR BLD AUTO: 36.8 % (ref 35–47)
HGB BLD-MCNC: 12.7 G/DL (ref 11.7–15.7)
IMM GRANULOCYTES # BLD: 0 10E3/UL
IMM GRANULOCYTES NFR BLD: 0 %
LYMPHOCYTES # BLD AUTO: 2.7 10E3/UL (ref 0.8–5.3)
LYMPHOCYTES NFR BLD AUTO: 26 %
MCH RBC QN AUTO: 32.2 PG (ref 26.5–33)
MCHC RBC AUTO-ENTMCNC: 34.5 G/DL (ref 31.5–36.5)
MCV RBC AUTO: 93 FL (ref 78–100)
MONOCYTES # BLD AUTO: 1 10E3/UL (ref 0–1.3)
MONOCYTES NFR BLD AUTO: 10 %
N GONORRHOEA DNA SPEC QL NAA+PROBE: NEGATIVE
NEUTROPHILS # BLD AUTO: 6.5 10E3/UL (ref 1.6–8.3)
NEUTROPHILS NFR BLD AUTO: 62 %
NRBC # BLD AUTO: 0 10E3/UL
NRBC BLD AUTO-RTO: 0 /100
PLATELET # BLD AUTO: 261 10E3/UL (ref 150–450)
POTASSIUM SERPL-SCNC: 3.7 MMOL/L (ref 3.4–5.3)
PROT SERPL-MCNC: 6.7 G/DL (ref 6.4–8.3)
RBC # BLD AUTO: 3.94 10E6/UL (ref 3.8–5.2)
SODIUM SERPL-SCNC: 135 MMOL/L (ref 135–145)
SPECIMEN TYPE: NORMAL
SPECIMEN TYPE: NORMAL
WBC # BLD AUTO: 10.5 10E3/UL (ref 4–11)

## 2025-04-07 PROCEDURE — 82077 ASSAY SPEC XCP UR&BREATH IA: CPT | Performed by: FAMILY MEDICINE

## 2025-04-07 PROCEDURE — 76801 OB US < 14 WKS SINGLE FETUS: CPT

## 2025-04-07 PROCEDURE — 80053 COMPREHEN METABOLIC PANEL: CPT | Performed by: FAMILY MEDICINE

## 2025-04-07 PROCEDURE — 85004 AUTOMATED DIFF WBC COUNT: CPT | Performed by: FAMILY MEDICINE

## 2025-04-07 PROCEDURE — 36415 COLL VENOUS BLD VENIPUNCTURE: CPT | Performed by: FAMILY MEDICINE

## 2025-04-07 PROCEDURE — 84702 CHORIONIC GONADOTROPIN TEST: CPT | Performed by: FAMILY MEDICINE

## 2025-04-07 ASSESSMENT — ACTIVITIES OF DAILY LIVING (ADL)
ADLS_ACUITY_SCORE: 53

## 2025-04-07 NOTE — ED NOTES
Pt is currently placing calls to friends and family to find a ride.  Food and fluids given to pt per her request.
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
## 2025-04-07 NOTE — ED NOTES
Pt asked writer to call daughter listed in chart for ride home.  Phone call was attempted at this time but there was no answer and no voicemail.
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
## 2025-04-08 ENCOUNTER — TELEPHONE (OUTPATIENT)
Dept: NURSING | Facility: CLINIC | Age: 40
End: 2025-04-08
Payer: COMMERCIAL

## 2025-04-08 NOTE — LETTER
April 10, 2025        Elizabet Stapleton  [address]  [place] [address]          Dear Elizabet Stapleton:    You were seen in the [facility] Emergency Department at [facility] on 4/6/2025.  We are unable to reach you by phone, so we are sending you this letter.     It is important that you call [facility] Emergency Department lab result nurse at [phone], as we have information to relay to you AND/OR we MAY have to make some changes in your treatment.    Best time to call back is between 9AM and 5:30PM, 7 days a week.      Sincerely,     [facility] Emergency Department Lab Result RN  [phone]

## 2025-04-08 NOTE — LETTER
April 8, 2025        Elizabet Stapleton  [address]  [place] [address]          Dear Elizabet Stapleton:    You were seen in the [facility] Emergency Department at [facility] on 4/7/2025.  We are unable to reach you by phone, so we are sending you this letter.     It is important that you call [facility] Emergency Department lab result nurse at [phone], as we have information to relay to you AND/OR we MAY have to make some changes in your treatment.    Best time to call back is between 9AM and 5:30PM, 7 days a week.      Sincerely,     [facility] Emergency Department Lab Result RN  [phone]
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
## 2025-04-10 LAB
BACTERIA UR CULT: ABNORMAL
BACTERIA UR CULT: ABNORMAL
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
## 2025-07-12 ENCOUNTER — HEALTH MAINTENANCE LETTER (OUTPATIENT)
Age: 40
End: 2025-07-12